# Patient Record
Sex: MALE | Race: OTHER | NOT HISPANIC OR LATINO | URBAN - METROPOLITAN AREA
[De-identification: names, ages, dates, MRNs, and addresses within clinical notes are randomized per-mention and may not be internally consistent; named-entity substitution may affect disease eponyms.]

---

## 2017-05-04 ENCOUNTER — OUTPATIENT (OUTPATIENT)
Dept: OUTPATIENT SERVICES | Facility: HOSPITAL | Age: 22
LOS: 1 days | Discharge: HOME | End: 2017-05-04

## 2017-06-28 DIAGNOSIS — C71.9 MALIGNANT NEOPLASM OF BRAIN, UNSPECIFIED: ICD-10-CM

## 2017-12-02 ENCOUNTER — EMERGENCY (EMERGENCY)
Facility: HOSPITAL | Age: 22
LOS: 0 days | Discharge: HOME | End: 2017-12-02
Admitting: PEDIATRICS

## 2017-12-02 DIAGNOSIS — Z98.890 OTHER SPECIFIED POSTPROCEDURAL STATES: ICD-10-CM

## 2017-12-02 DIAGNOSIS — H81.10 BENIGN PAROXYSMAL VERTIGO, UNSPECIFIED EAR: ICD-10-CM

## 2017-12-02 DIAGNOSIS — R42 DIZZINESS AND GIDDINESS: ICD-10-CM

## 2017-12-24 ENCOUNTER — OUTPATIENT (OUTPATIENT)
Dept: OUTPATIENT SERVICES | Facility: HOSPITAL | Age: 22
LOS: 1 days | Discharge: HOME | End: 2017-12-24

## 2017-12-24 DIAGNOSIS — R51 HEADACHE: ICD-10-CM

## 2019-10-20 ENCOUNTER — INPATIENT (INPATIENT)
Facility: HOSPITAL | Age: 24
LOS: 4 days | Discharge: HOME | End: 2019-10-25
Attending: INTERNAL MEDICINE | Admitting: INTERNAL MEDICINE
Payer: COMMERCIAL

## 2019-10-20 VITALS
RESPIRATION RATE: 20 BRPM | TEMPERATURE: 97 F | DIASTOLIC BLOOD PRESSURE: 70 MMHG | SYSTOLIC BLOOD PRESSURE: 125 MMHG | OXYGEN SATURATION: 98 % | HEART RATE: 71 BPM

## 2019-10-20 LAB
ANION GAP SERPL CALC-SCNC: 10 MMOL/L — SIGNIFICANT CHANGE UP (ref 7–14)
BASOPHILS # BLD AUTO: 0.04 K/UL — SIGNIFICANT CHANGE UP (ref 0–0.2)
BASOPHILS NFR BLD AUTO: 0.6 % — SIGNIFICANT CHANGE UP (ref 0–1)
BUN SERPL-MCNC: 8 MG/DL — LOW (ref 10–20)
CALCIUM SERPL-MCNC: 9.9 MG/DL — SIGNIFICANT CHANGE UP (ref 8.5–10.1)
CHLORIDE SERPL-SCNC: 103 MMOL/L — SIGNIFICANT CHANGE UP (ref 98–110)
CO2 SERPL-SCNC: 28 MMOL/L — SIGNIFICANT CHANGE UP (ref 17–32)
CREAT SERPL-MCNC: 1 MG/DL — SIGNIFICANT CHANGE UP (ref 0.7–1.5)
EOSINOPHIL # BLD AUTO: 0.21 K/UL — SIGNIFICANT CHANGE UP (ref 0–0.7)
EOSINOPHIL NFR BLD AUTO: 3.1 % — SIGNIFICANT CHANGE UP (ref 0–8)
GLUCOSE SERPL-MCNC: 97 MG/DL — SIGNIFICANT CHANGE UP (ref 70–99)
HCT VFR BLD CALC: 49.1 % — SIGNIFICANT CHANGE UP (ref 42–52)
HGB BLD-MCNC: 16.3 G/DL — SIGNIFICANT CHANGE UP (ref 14–18)
IMM GRANULOCYTES NFR BLD AUTO: 0.4 % — HIGH (ref 0.1–0.3)
LYMPHOCYTES # BLD AUTO: 1.55 K/UL — SIGNIFICANT CHANGE UP (ref 1.2–3.4)
LYMPHOCYTES # BLD AUTO: 23.1 % — SIGNIFICANT CHANGE UP (ref 20.5–51.1)
MCHC RBC-ENTMCNC: 28.4 PG — SIGNIFICANT CHANGE UP (ref 27–31)
MCHC RBC-ENTMCNC: 33.2 G/DL — SIGNIFICANT CHANGE UP (ref 32–37)
MCV RBC AUTO: 85.5 FL — SIGNIFICANT CHANGE UP (ref 80–94)
MONOCYTES # BLD AUTO: 0.51 K/UL — SIGNIFICANT CHANGE UP (ref 0.1–0.6)
MONOCYTES NFR BLD AUTO: 7.6 % — SIGNIFICANT CHANGE UP (ref 1.7–9.3)
NEUTROPHILS # BLD AUTO: 4.37 K/UL — SIGNIFICANT CHANGE UP (ref 1.4–6.5)
NEUTROPHILS NFR BLD AUTO: 65.2 % — SIGNIFICANT CHANGE UP (ref 42.2–75.2)
NRBC # BLD: 0 /100 WBCS — SIGNIFICANT CHANGE UP (ref 0–0)
PLATELET # BLD AUTO: 231 K/UL — SIGNIFICANT CHANGE UP (ref 130–400)
POTASSIUM SERPL-MCNC: 4.5 MMOL/L — SIGNIFICANT CHANGE UP (ref 3.5–5)
POTASSIUM SERPL-SCNC: 4.5 MMOL/L — SIGNIFICANT CHANGE UP (ref 3.5–5)
RBC # BLD: 5.74 M/UL — SIGNIFICANT CHANGE UP (ref 4.7–6.1)
RBC # FLD: 12.2 % — SIGNIFICANT CHANGE UP (ref 11.5–14.5)
SODIUM SERPL-SCNC: 141 MMOL/L — SIGNIFICANT CHANGE UP (ref 135–146)
WBC # BLD: 6.71 K/UL — SIGNIFICANT CHANGE UP (ref 4.8–10.8)
WBC # FLD AUTO: 6.71 K/UL — SIGNIFICANT CHANGE UP (ref 4.8–10.8)

## 2019-10-20 PROCEDURE — 70496 CT ANGIOGRAPHY HEAD: CPT | Mod: 26

## 2019-10-20 PROCEDURE — 99285 EMERGENCY DEPT VISIT HI MDM: CPT

## 2019-10-20 PROCEDURE — 70498 CT ANGIOGRAPHY NECK: CPT | Mod: 26

## 2019-10-20 PROCEDURE — 99222 1ST HOSP IP/OBS MODERATE 55: CPT

## 2019-10-20 PROCEDURE — 93010 ELECTROCARDIOGRAM REPORT: CPT

## 2019-10-20 RX ORDER — CHLORHEXIDINE GLUCONATE 213 G/1000ML
1 SOLUTION TOPICAL
Refills: 0 | Status: DISCONTINUED | OUTPATIENT
Start: 2019-10-20 | End: 2019-10-25

## 2019-10-20 RX ORDER — MECLIZINE HCL 12.5 MG
25 TABLET ORAL THREE TIMES A DAY
Refills: 0 | Status: DISCONTINUED | OUTPATIENT
Start: 2019-10-20 | End: 2019-10-22

## 2019-10-20 RX ORDER — MECLIZINE HCL 12.5 MG
1 TABLET ORAL
Qty: 60 | Refills: 0
Start: 2019-10-20 | End: 2019-11-18

## 2019-10-20 RX ORDER — SODIUM CHLORIDE 9 MG/ML
1000 INJECTION, SOLUTION INTRAVENOUS ONCE
Refills: 0 | Status: COMPLETED | OUTPATIENT
Start: 2019-10-20 | End: 2019-10-20

## 2019-10-20 RX ORDER — MECLIZINE HCL 12.5 MG
25 TABLET ORAL ONCE
Refills: 0 | Status: COMPLETED | OUTPATIENT
Start: 2019-10-20 | End: 2019-10-20

## 2019-10-20 RX ADMIN — SODIUM CHLORIDE 1000 MILLILITER(S): 9 INJECTION, SOLUTION INTRAVENOUS at 12:51

## 2019-10-20 RX ADMIN — Medication 25 MILLIGRAM(S): at 11:40

## 2019-10-20 NOTE — CONSULT NOTE ADULT - ATTENDING COMMENTS
I have personally seen and examined this patient.  I have fully participated in the care of this patient.  I have reviewed all pertinent clinical information, influding history, physical exam, plan and note.   I have reviewed all pertinent clinical information and reviewed all relevant imaging and diagnostic studies personally.  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  See progress note for updated recommendations.

## 2019-10-20 NOTE — H&P ADULT - NSHPLABSRESULTS_GEN_ALL_CORE
16.3   6.71  )-----------( 231      ( 20 Oct 2019 12:40 )             49.1     10-20    141  |  103  |  8<L>  ----------------------------<  97  4.5   |  28  |  1.0    Ca    9.9      20 Oct 2019 12:40        < from: 12 Lead ECG (10.20.19 @ 12:19) >    Diagnosis Line Normal sinus rhythm  Normal ECG    < end of copied text >      < from: CT Angio Head w/ IV Cont (10.20.19 @ 16:09) >    IMPRESSION:     No evidence of major vascular stenosis or occlusion.    < end of copied text >    IMPRESSION:     No evidence of major vascular stenosis or occlusion.    < end of copied text >

## 2019-10-20 NOTE — ED PROVIDER NOTE - NS ED ROS FT
Eyes:  No visual changes, eye pain or discharge.  ENMT:  +hearing changes, no pain, no sore throat or runny nose, no difficulty swallowing  Cardiac:  No chest pain, SOB or edema. No chest pain with exertion.  Respiratory:  No cough or respiratory distress.    GI:  No nausea, vomiting, diarrhea or abdominal pain.  :  No dysuria, frequency or burning.  MS:  No myalgia, muscle weakness, joint pain or back pain.  Neuro:  +headache , no weakness.  No LOC.  Skin:  No skin rash.   Endocrine: No history of thyroid disease or diabetes.

## 2019-10-20 NOTE — ED ADULT NURSE NOTE - NSIMPLEMENTINTERV_GEN_ALL_ED
Implemented All Universal Safety Interventions:  [place] to call system. Call bell, personal items and telephone within reach. Instruct patient to call for assistance. Room bathroom lighting operational. Non-slip footwear when patient is off stretcher. Physically safe environment: no spills, clutter or unnecessary equipment. Stretcher in lowest position, wheels locked, appropriate side rails in place.

## 2019-10-20 NOTE — ED ADULT NURSE REASSESSMENT NOTE - NS ED NURSE REASSESS COMMENT FT1
Pt assessed no changes in complaints at this time. pt refusing bed alarm at this time. on continuous cardiac monitoring and pulse ox monitoring.

## 2019-10-20 NOTE — ED ADULT NURSE NOTE - OBJECTIVE STATEMENT
Pt c/o dizziness for about a month. Pt saw neurologist for this symptom and received referral for an MRI. Pt c/o dizziness for about a month associated with headaches at times and left facial and right leg numbness. Pt saw neurologist for this symptom and received referral for an MRI. Denies chest pain, SOB, fevers, chills, blurry vision, or syncope.

## 2019-10-20 NOTE — H&P ADULT - NSHPPHYSICALEXAM_GEN_ALL_CORE
T(C): 36.7 (10-20-19 @ 20:48), Max: 36.7 (10-20-19 @ 20:48)  T(F): 98 (10-20-19 @ 20:48), Max: 98 (10-20-19 @ 20:48)  HR: 81 (10-20-19 @ 20:48) (70 - 81)  BP: 111/68 (10-20-19 @ 20:48) (111/68 - 125/70)  RR: 18 (10-20-19 @ 20:48) (18 - 98)  SpO2: 98% (10-20-19 @ 20:48) (98% - 98%)  Wt(kg): --    PHYSICAL EXAM:    General: Not in distress. Well-developed, speaks in full sentences. AAOx3  HEENT: Atraumatic, normocephalic. Moist mucus membranes. L pupil fixed and dilated  Cardio: Regular rate and rhythm. S1, S2 appreciated. no murmurs.  Pulm: Bilateral breath sounds. No wheezing, or rhonchi  Abdomen: Soft, non-tender, non-distended. Normoactive bowel sounds.  Extremities: No cyanosis or edema bilaterally. No calf tenderness to palpation.  Neuro: . Motor 5/5 throughout. Sensation intact  L facial numbness.. CN II-XI intact

## 2019-10-20 NOTE — H&P ADULT - ASSESSMENT
24y M with PMH of vertigo  presenting with a 1 month history of  dizziness.    #Chronic dizziness, L facial numbness, RLE numbness  - BPPV vs vestibular neuritis vs meniere's vs Multiple sclerosis  - Pt reports constantsymptoms lasting 2 weeks which goes against BPPV.  - Consider Audiometry to r/o meniere's disease as pt also endorses R ear heaviness, and tinnitus.  - MRI Head and c-spine  - B12, folate, TSH  - Admit to Stroke unit. F/u  neurology  - c/w meclizine 25mg q8hr standing for symptomatic control.     Diet: Regular diet  Dvt Ppx: Not indicated  Activity: out of bed to chair     #Dispo: Stroke unit    #Code Status: Full Code

## 2019-10-20 NOTE — ED ADULT TRIAGE NOTE - CHIEF COMPLAINT QUOTE
lightheaded and dizziness since the beginning of the month. I went to my neurologist and I have a script for an MRI

## 2019-10-20 NOTE — CONSULT NOTE ADULT - SUBJECTIVE AND OBJECTIVE BOX
NEUROENDOVASCULAR CONSULT NOTE    Consulted by Dr. Wade for dizziness.    Patient is a 24y old  Male who presents with a chief complaint of dizziness    HPI: This is a 24 year old right handed man with past medical history of vertigo, dizziness and trauma to his left pupil in 2009 after paintballing presents with worsening left facial/tongue numbness, lightheadedness, and dizziness when walking. He reports these similar symptoms since the beginning of this month but was more severe since this morning. Dizziness is worst when lying flat and has sensation of the "room spinning". Patient has seen his neurologist Dr. Sweet in [place] who prescribed an Brain MRI and MRI C-spine for MS work up as per patient and he was going to make an appointment for. He denies history of aneurysm/stroke in past, denies family history of neurological concerns. Denies visual changes with dizziness, tingling. He recently came back from [place], denies tick bites, but admits to recent URI last month.       PAST MEDICAL & SURGICAL HISTORY:  Vertigo    FAMILY HISTORY:  Denies any personal or familial history of aneurysm, chronic infectious/inflammatory disease, connective tissue disease (Chalino-Danlos syndrome or Marfan syndrome), PCKD    SOCIAL HISTORY:  Smoking/ETOH/Drugs: + smokes hookah, occasional drinks, denies drugs    Allergies  No Known Allergies  Intolerances    REVIEW OF SYSTEMS  Constitutional: No fever, weight loss or fatigue  Eyes: No eye pain, visual disturbances, or discharge  ENMT:  No difficulty hearing, tinnitus, vertigo; No sinus or throat pain  Neck: No pain or stiffness  Respiratory: No cough, wheezing, chills or hemoptysis  Cardiovascular: No chest pain, palpitations, shortness of breath, dizziness or leg swelling  Gastrointestinal: No abdominal pain. No nausea, vomiting or hematemesis; No diarrhea or constipation  Genitourinary: No dysuria, frequency, hematuria or incontinence  Neurological: As per HPI, + dizziness  Skin: No itching, burning, rashes or lesions   Endocrine: No heat or cold intolerance; No hair loss  Musculoskeletal: No joint pain or swelling; No muscle, back or extremity pain  Psychiatric: No depression, anxiety, mood swings or difficulty sleeping  Heme/Lymph: No easy bruising or bleeding gums    PHYSICAL EXAM:    Vital Signs Last 24 Hrs  T(C): 36.2 (20 Oct 2019 10:53), Max: 36.2 (20 Oct 2019 10:53)  T(F): 97.2 (20 Oct 2019 10:53), Max: 97.2 (20 Oct 2019 10:53)  HR: 71 (20 Oct 2019 10:53) (71 - 71)  BP: 125/70 (20 Oct 2019 10:53) (125/70 - 125/70)  BP(mean): --  RR: 20 (20 Oct 2019 10:53) (20 - 20)  SpO2: 98% (20 Oct 2019 10:53) (98% - 98%)    Neurologic Exam:  Mental status: Awake, alert and oriented x 4. Recent and remote memory intact. Naming, repetition and comprehension intact.  Attention/concentration intact.  No dysarthria, no aphasia.  Fund of knowledge appropriate.    Cranial nerves: Right pupil equally round and reactive to light 4 to 3mm. Left pupil blown/damaged. Visual fields full. Right eye 20/20, left eye acuity impaired, no nystagmus, extraocular muscles intact, V1 through V3 intact bilaterally and symmetric, face symmetric, hearing intact to finger rub, palate elevation symmetric, tongue was midline, sternocleidomastoid/shoulder shrug strength bilaterally 5/5.    Motor:  Normal bulk and tone, strength 5/5 in bilateral upper and lower extremities.  strength 5/5.  Fingers abduction/adduction strong 5/5. Rapid alternating movements intact and symmetric.   Sensation: Intact to light touch, proprioception, and pinprick.  No neglect.   Coordination: No dysmetria on finger-to-nose and heel-to-shin.  No clumsiness.  Reflexes: 2+ in upper and lower extremities, downgoing toes bilaterally  Gait: Narrow and steady. No ataxia.  Romberg negative    Cardio: +S1S2 No M/R/G  Pulm: CTA B/L no W/R/R  Skin: Warm, Dry, Capillary refill <2 seconds, good skin turgor  Abd: Soft, NTND  Ext: no c/c/e    NIH STROKE SCALE  Item	                                                        Score  1 a.	Level of Consciousness	               	0  1 b. LOC Questions	                                    0  1 c.	LOC Commands	                               	0  2.	Best Gaze	                                    0  3.	Visual	                                                0  4.	Facial Palsy	                                    0  5 a.	Motor Arm - Left	                        0  5 b.	Motor Arm - Right	                        0  6 a.	Motor Leg - Left	                                0  6 b.	Motor Leg - Right	                                0  7.	Limb Ataxia	                                        0  8.	Sensory	                                                0  9.	Language	                                        0  10.	Dysarthria	                                        0  11.	Extinction and Inattention  	        0  ______________________________________  TOTAL	                                                        0    mRS: 0 No symptoms at all    LABS                        16.3   6.71  )-----------( 231      ( 20 Oct 2019 12:40 )             49.1     10-20    141  |  103  |  8<L>  ----------------------------<  97  4.5   |  28  |  1.0    Ca    9.9      20 Oct 2019 12:40    RADIOLOGY/EKG:  < from: CT Angio Head w/ IV Cont (10.20.19 @ 16:09) >  EXAM:  CT ANGIO NECK (W)AW IC        EXAM:  CT ANGIO BRAIN (W)AW IC          PROCEDURE DATE:  10/20/2019        INTERPRETATION:  Clinical History / Reason for exam: Left facial   numbness, right lower extremity numbness, headache, walking to the left.    Technique: CT angiogram of the head and neck. CTA of the head and neck   was performed following the intravenous administration of 100 cc Optiray   320 (0 cc discarded) with coronal, sagittal and multiple 3-D MIP and   volume rendered reformats.    Comparison: None  An MRI brain with and without contrast 12/24/2017 is reviewed.    Findings:     NECK:  The visualized aortic arch and great vessel origins are patent.    The right common, internal and external carotid arteries are patent.    The left common, internal and external carotid arteries are patent.    The vertebral arteries are patent. There is a direct origin of the left   vertebral artery from the aortic arch, normal variation.    HEAD:  The distal internal carotid arteries are patent. The middle cerebral   arteries are patent. There is a dominant bihemispheric right anterior   cerebral artery common normal variation.  The left NOLA is diminutive.    The distal vertebral arteries are patent.  The basilar artery is patent.   The posterior cerebral arteries are patent.    OTHER:  Patient is status post left scleral banding.  Mild cervical spine degenerative changes.    IMPRESSION:   No evidence of major vascular stenosis or occlusion.      QRS axis to [] ° and NSR at a rate of [] BPM. There was no atrial enlargement. There was no ventricular hypertrophy. There were no ST-T changes and all intervals were normal. Neurology CONSULT NOTE    Consulted by Dr. Wade for dizziness.    Patient is a 24y old  Male who presents with a chief complaint of dizziness    HPI: This is a 24 year old right handed man with past medical history of vertigo, dizziness and trauma to his left pupil in 2009 after paintballing presents with worsening left facial/tongue numbness, lightheadedness, and dizziness when walking. He reports these similar symptoms since the beginning of this month but was more severe since this morning. Dizziness is worst when lying flat and has sensation of the "room spinning". Patient has seen his neurologist Dr. Sweet in [place] who prescribed an Brain MRI and MRI C-spine for MS work up as per patient and he was going to make an appointment for. He denies history of aneurysm/stroke in past, denies family history of neurological concerns. Denies visual changes with dizziness, tingling. He recently came back from [place], denies tick bites, but admits to recent URI last month.       PAST MEDICAL & SURGICAL HISTORY:  Vertigo    FAMILY HISTORY:  Denies any personal or familial history of aneurysm, chronic infectious/inflammatory disease, connective tissue disease (Chalino-Danlos syndrome or Marfan syndrome), PCKD    SOCIAL HISTORY:  Smoking/ETOH/Drugs: + smokes hookah, occasional drinks, denies drugs    Allergies  No Known Allergies  Intolerances    REVIEW OF SYSTEMS  Constitutional: No fever, weight loss or fatigue  Eyes: No eye pain, visual disturbances, or discharge  ENMT:  No difficulty hearing, tinnitus, vertigo; No sinus or throat pain  Neck: No pain or stiffness  Respiratory: No cough, wheezing, chills or hemoptysis  Cardiovascular: No chest pain, palpitations, shortness of breath, dizziness or leg swelling  Gastrointestinal: No abdominal pain. No nausea, vomiting or hematemesis; No diarrhea or constipation  Genitourinary: No dysuria, frequency, hematuria or incontinence  Neurological: As per HPI, + dizziness  Skin: No itching, burning, rashes or lesions   Endocrine: No heat or cold intolerance; No hair loss  Musculoskeletal: No joint pain or swelling; No muscle, back or extremity pain  Psychiatric: No depression, anxiety, mood swings or difficulty sleeping  Heme/Lymph: No easy bruising or bleeding gums    PHYSICAL EXAM:    Vital Signs Last 24 Hrs  T(C): 36.2 (20 Oct 2019 10:53), Max: 36.2 (20 Oct 2019 10:53)  T(F): 97.2 (20 Oct 2019 10:53), Max: 97.2 (20 Oct 2019 10:53)  HR: 71 (20 Oct 2019 10:53) (71 - 71)  BP: 125/70 (20 Oct 2019 10:53) (125/70 - 125/70)  BP(mean): --  RR: 20 (20 Oct 2019 10:53) (20 - 20)  SpO2: 98% (20 Oct 2019 10:53) (98% - 98%)    Neurologic Exam:  Mental status: Awake, alert and oriented x 4. Recent and remote memory intact. Naming, repetition and comprehension intact.  Attention/concentration intact.  No dysarthria, no aphasia.  Fund of knowledge appropriate.    Cranial nerves: Right pupil equally round and reactive to light 4 to 3mm. Left pupil blown/damaged. Visual fields full. Right eye 20/20, left eye acuity impaired, no nystagmus, extraocular muscles intact, V1 through V3 intact bilaterally and symmetric, face symmetric, hearing intact to finger rub, palate elevation symmetric, tongue was midline, sternocleidomastoid/shoulder shrug strength bilaterally 5/5.    Motor:  Normal bulk and tone, strength 5/5 in bilateral upper and lower extremities.  strength 5/5.  Fingers abduction/adduction strong 5/5. Rapid alternating movements intact and symmetric.   Sensation: Intact to light touch, proprioception, and pinprick.  No neglect.   Coordination: No dysmetria on finger-to-nose and heel-to-shin.  No clumsiness.  Reflexes: 2+ in upper and lower extremities, downgoing toes bilaterally  Gait: Narrow and steady. subtle ataxia LUE.    Cardio: +S1S2 No M/R/G  Pulm: CTA B/L no W/R/R  Skin: Warm, Dry, Capillary refill <2 seconds, good skin turgor  Abd: Soft, NTND  Ext: no c/c/e    mRS: 0 No symptoms at all  NIHSS: 0    LABS                        16.3   6.71  )-----------( 231      ( 20 Oct 2019 12:40 )             49.1     10-20    141  |  103  |  8<L>  ----------------------------<  97  4.5   |  28  |  1.0    Ca    9.9      20 Oct 2019 12:40    RADIOLOGY/EKG:  < from: CT Angio Head w/ IV Cont (10.20.19 @ 16:09) >  EXAM:  CT ANGIO NECK (W)AW IC        EXAM:  CT ANGIO BRAIN (W)AW IC          PROCEDURE DATE:  10/20/2019        INTERPRETATION:  Clinical History / Reason for exam: Left facial   numbness, right lower extremity numbness, headache, walking to the left.    Technique: CT angiogram of the head and neck. CTA of the head and neck   was performed following the intravenous administration of 100 cc Optiray   320 (0 cc discarded) with coronal, sagittal and multiple 3-D MIP and   volume rendered reformats.    Comparison: None  An MRI brain with and without contrast 12/24/2017 is reviewed.    Findings:     NECK:  The visualized aortic arch and great vessel origins are patent.    The right common, internal and external carotid arteries are patent.    The left common, internal and external carotid arteries are patent.    The vertebral arteries are patent. There is a direct origin of the left   vertebral artery from the aortic arch, normal variation.    HEAD:  The distal internal carotid arteries are patent. The middle cerebral   arteries are patent. There is a dominant bihemispheric right anterior   cerebral artery common normal variation.  The left NOLA is diminutive.    The distal vertebral arteries are patent.  The basilar artery is patent.   The posterior cerebral arteries are patent.    OTHER:  Patient is status post left scleral banding.  Mild cervical spine degenerative changes.    IMPRESSION:   No evidence of major vascular stenosis or occlusion.      QRS axis to [] ° and NSR at a rate of [] BPM. There was no atrial enlargement. There was no ventricular hypertrophy. There were no ST-T changes and all intervals were normal.

## 2019-10-20 NOTE — ED PROVIDER NOTE - CLINICAL SUMMARY MEDICAL DECISION MAKING FREE TEXT BOX
Pt w/ dizziness and balance disturbance. CTA head neck neg. Pt stated felt better, prepped for dc, but when walking again, still had unstable gait and sx returned.   Pt w/ worsening neurologic sx.  Discussed case w/ Dr. Ascencio, Neuro. Pt admitted to stroke unit for further management.

## 2019-10-20 NOTE — ED PROVIDER NOTE - CARE PLAN
Principal Discharge DX:	Vertigo  Secondary Diagnosis:	Leg numbness  Secondary Diagnosis:	Facial numbness

## 2019-10-20 NOTE — H&P ADULT - ATTENDING COMMENTS
24y M with PMH of vertigo  presenting with a 1 month history of  dizziness.    Pt endorses dizziness that started at the beginning of the month. It was intermittent at first, but has become constant lasting the past 2 weeks. His dizziness is associated with intermittent headaches, L facial and RLE numbness, and is not associated with sudden head movements, or situational triggers. It gets worse when he lies flat. Pt also endorses sensation of "room spinning" around him, and intermittent tinnitus in his R ear, as well as R ear fullness/"heaviness". He saw a neurolgoist in [place] who referred him for an MRI Head and c-pine, which was scheduled for later this week.    Of note, L pupil is blown out from an old paintball injury; no new visual changes.    REVIEW OF SYSTEMS:  CONSTITUTIONAL: No weakness, fevers or chills  EYES/ENT: No visual changes;  No vertigo or throat pain   NECK: No pain or stiffness  RESPIRATORY: No cough, wheezing, hemoptysis; No shortness of breath  CARDIOVASCULAR: No chest pain or palpitations  GASTROINTESTINAL: No abdominal or epigastric pain. No nausea, vomiting, or hematemesis; No diarrhea or constipation. No melena or hematochezia.  GENITOURINARY: No dysuria, frequency or hematuria  NEUROLOGICAL: (+) numbness - see cc/HPI   SKIN: No itching, rashes    Physical Exam:  General: WN/WD NAD  Neurology: A&Ox3, nonfocal, follows commands  Eyes: L pupil injury 'blown out'/ EOMI  ENT/Neck: Neck supple, trachea midline, No JVD  Respiratory: CTA B/L, No wheezing, rales, rhonchi  CV: Normal rate regular rhythm, S1S2, no murmurs, rubs or gallops  Abdominal: Soft, NT, ND +BS,   Extremities: No edema, + peripheral pulses  Skin: No Rashes, Hematoma, Ecchymosis  Incisions:   Tubes:    A/P  Chronic dizziness / paresthesias - L facial and RLE numbness r/o demyelinating disease r/o posterior circulation lesions   -Admit to stroke unit  -MRI head / C-spin w/ and w/o gado  -B12, Folate, TSH   -Neurology eval   -trial of Meclizine     DVT prophylaxis

## 2019-10-20 NOTE — ED PROVIDER NOTE - CARE PROVIDER_API CALL
Antonio Johnson)  EEGEpilepsy; Neurology  [address] [place] [address], [address]  [place] [address]  Phone: [phone]  Fax: [phone]  Follow Up Time: 4-6 Days

## 2019-10-20 NOTE — ED PROVIDER NOTE - PHYSICAL EXAMINATION
Vital Signs: I have reviewed the initial vital signs.  Constitutional: NAD, well-nourished, appears stated age, no acute distress.  HEENT: Airway patent, moist MM, no erythema/swelling/deformity of oral structures. EOMI, PERRLA. TMs clear BL  CV: regular rate, regular rhythm, well-perfused extremities, 2+ b/l DP and radial pulses equal.  Lungs: BCTA, no increased WOB.  ABD: NTND, no guarding or rebound, no pulsatile mass, no hernias.   MSK: Neck supple, nontender, nl ROM, no stepoff. Chest nontender. Back nontender in TLS spine or to b/l bony structures or flanks. Ext nontender, nl rom, no deformity.   INTEG: Skin warm, dry, no rash.  NEURO: A&Ox3, moving all extremities, normal speech. L pupil blown . subjective sensory differential on left face versus right. cn 2-12 otherwise intact, normal strength gait and cerebellar.   PSYCH: Calm, cooperative, normal affect and interaction.

## 2019-10-20 NOTE — ED PROVIDER NOTE - ATTENDING CONTRIBUTION TO CARE
25 y/o M no sig pmh her for eval dizziness, drifting to L w/ gait, R leg numbness (resolved) and L facial numbness intermittent now relatively constant x 1wk. + gradual onset, mild- moderate, non radiating. Seen by neurologist, has MRI scheduled for this week, but worse today. + mild HA. Had some ear fullness this week that resolved.    CONSTITUTIONAL: NAD  SKIN: Warm dry  HEAD: NCAT  EYES: NL inspection  ENT: MMM; TM's clear  NECK: Supple; non tender.  CARD: RRR, no murmur  RESP: CTAB  ABD: S/NT no R/G  EXT: no pedal edema  NEURO: + decreased sensation L V2/v3, CN o/w wnl; NL motor and sensory; no past pointing, NL heel shin; unsteady gait w/ listing to L  PSYCH: Cooperative    IMP: posterior circulation d/o?  P: labs, ekg, cta neck/ head, ivf, reglan, reassess.

## 2019-10-20 NOTE — ED PROVIDER NOTE - PATIENT PORTAL LINK FT
You can access the FollowMyHealth Patient Portal offered by [facility] by registering at the following website: [email or web address]. By joining [facility]’s FollowMyHealth portal, you will also be able to view your health information using other applications (apps) compatible with our system.

## 2019-10-20 NOTE — H&P ADULT - HISTORY OF PRESENT ILLNESS
24y M with PMH of vertigo  presenting with a 1 month history of  dizziness.    Pt endorses dizziness that started at the beginning of the month. It was intermittent at first, but has become constant lasting the past 2 weeks. His dizziness is associated with intermittent headaches, L facial and RLE numbness. It gets worse when he lies flat. Pt also endorses sensation of "room spinning" around him, and intermittent tinnitus in his R ear. He saw a neurolgoist in [place] who referred him for an MRI Head and c-pine, which was scheduled for later this week.  Pt also endorses R ear fullness/heaviness  Of note, L pupil is blown out from an old paintball injury; no new visual changes.    ED Course:  VS: /70 HR 71 T 97.2 satting 98% on RA. He received meclizine 25mg x1 w/ mild improvement in his symptoms, and 1L LR. 24y M with PMH of vertigo  presenting with a 1 month history of  dizziness.    Pt endorses dizziness that started at the beginning of the month. It was intermittent at first, but has become constant lasting the past 2 weeks. His dizziness is associated with intermittent headaches, L facial and RLE numbness, and is not associated with sudden head movements, or situational triggers. It gets worse when he lies flat. Pt also endorses sensation of "room spinning" around him, and intermittent tinnitus in his R ear, as well as R ear fullness/"heaviness". He saw a neurolgoist in [place] who referred him for an MRI Head and c-pine, which was scheduled for later this week.    Of note, L pupil is blown out from an old paintball injury; no new visual changes.    ED Course:  VS: /70 HR 71 T 97.2 satting 98% on RA. He received meclizine 25mg x1 w/ mild improvement in his symptoms, and 1L LR.

## 2019-10-21 LAB
ALBUMIN SERPL ELPH-MCNC: 4.2 G/DL — SIGNIFICANT CHANGE UP (ref 3.5–5.2)
ALP SERPL-CCNC: 43 U/L — SIGNIFICANT CHANGE UP (ref 30–115)
ALT FLD-CCNC: 10 U/L — SIGNIFICANT CHANGE UP (ref 0–41)
ANION GAP SERPL CALC-SCNC: 12 MMOL/L — SIGNIFICANT CHANGE UP (ref 7–14)
AST SERPL-CCNC: 14 U/L — SIGNIFICANT CHANGE UP (ref 0–41)
BILIRUB SERPL-MCNC: 0.6 MG/DL — SIGNIFICANT CHANGE UP (ref 0.2–1.2)
BUN SERPL-MCNC: 16 MG/DL — SIGNIFICANT CHANGE UP (ref 10–20)
CALCIUM SERPL-MCNC: 9.5 MG/DL — SIGNIFICANT CHANGE UP (ref 8.5–10.1)
CHLORIDE SERPL-SCNC: 104 MMOL/L — SIGNIFICANT CHANGE UP (ref 98–110)
CO2 SERPL-SCNC: 26 MMOL/L — SIGNIFICANT CHANGE UP (ref 17–32)
CREAT SERPL-MCNC: 0.9 MG/DL — SIGNIFICANT CHANGE UP (ref 0.7–1.5)
FOLATE SERPL-MCNC: 2.9 NG/ML — LOW
GLUCOSE SERPL-MCNC: 108 MG/DL — HIGH (ref 70–99)
HCT VFR BLD CALC: 45.1 % — SIGNIFICANT CHANGE UP (ref 42–52)
HGB BLD-MCNC: 14.8 G/DL — SIGNIFICANT CHANGE UP (ref 14–18)
HIV 1+2 AB+HIV1 P24 AG SERPL QL IA: SIGNIFICANT CHANGE UP
MAGNESIUM SERPL-MCNC: 2.1 MG/DL — SIGNIFICANT CHANGE UP (ref 1.8–2.4)
MCHC RBC-ENTMCNC: 28.4 PG — SIGNIFICANT CHANGE UP (ref 27–31)
MCHC RBC-ENTMCNC: 32.8 G/DL — SIGNIFICANT CHANGE UP (ref 32–37)
MCV RBC AUTO: 86.6 FL — SIGNIFICANT CHANGE UP (ref 80–94)
NRBC # BLD: 0 /100 WBCS — SIGNIFICANT CHANGE UP (ref 0–0)
PLATELET # BLD AUTO: 212 K/UL — SIGNIFICANT CHANGE UP (ref 130–400)
POTASSIUM SERPL-MCNC: 4.1 MMOL/L — SIGNIFICANT CHANGE UP (ref 3.5–5)
POTASSIUM SERPL-SCNC: 4.1 MMOL/L — SIGNIFICANT CHANGE UP (ref 3.5–5)
PROT SERPL-MCNC: 6.3 G/DL — SIGNIFICANT CHANGE UP (ref 6–8)
RBC # BLD: 5.21 M/UL — SIGNIFICANT CHANGE UP (ref 4.7–6.1)
RBC # FLD: 12.4 % — SIGNIFICANT CHANGE UP (ref 11.5–14.5)
SODIUM SERPL-SCNC: 142 MMOL/L — SIGNIFICANT CHANGE UP (ref 135–146)
TSH SERPL-MCNC: 0.4 UIU/ML — SIGNIFICANT CHANGE UP (ref 0.27–4.2)
VIT B12 SERPL-MCNC: 389 PG/ML — SIGNIFICANT CHANGE UP (ref 232–1245)
WBC # BLD: 6.72 K/UL — SIGNIFICANT CHANGE UP (ref 4.8–10.8)
WBC # FLD AUTO: 6.72 K/UL — SIGNIFICANT CHANGE UP (ref 4.8–10.8)

## 2019-10-21 PROCEDURE — 72156 MRI NECK SPINE W/O & W/DYE: CPT | Mod: 26

## 2019-10-21 PROCEDURE — 99254 IP/OBS CNSLTJ NEW/EST MOD 60: CPT

## 2019-10-21 PROCEDURE — 70553 MRI BRAIN STEM W/O & W/DYE: CPT | Mod: 26

## 2019-10-21 PROCEDURE — 99233 SBSQ HOSP IP/OBS HIGH 50: CPT

## 2019-10-21 RX ORDER — ENOXAPARIN SODIUM 100 MG/ML
40 INJECTION SUBCUTANEOUS DAILY
Refills: 0 | Status: DISCONTINUED | OUTPATIENT
Start: 2019-10-21 | End: 2019-10-25

## 2019-10-21 RX ORDER — PANTOPRAZOLE SODIUM 20 MG/1
40 TABLET, DELAYED RELEASE ORAL DAILY
Refills: 0 | Status: DISCONTINUED | OUTPATIENT
Start: 2019-10-21 | End: 2019-10-25

## 2019-10-21 RX ORDER — ASPIRIN/CALCIUM CARB/MAGNESIUM 324 MG
81 TABLET ORAL DAILY
Refills: 0 | Status: DISCONTINUED | OUTPATIENT
Start: 2019-10-21 | End: 2019-10-25

## 2019-10-21 RX ADMIN — Medication 81 MILLIGRAM(S): at 11:02

## 2019-10-21 RX ADMIN — Medication 50 MILLIGRAM(S): at 18:53

## 2019-10-21 RX ADMIN — Medication 25 MILLIGRAM(S): at 21:07

## 2019-10-21 RX ADMIN — PANTOPRAZOLE SODIUM 40 MILLIGRAM(S): 20 TABLET, DELAYED RELEASE ORAL at 16:29

## 2019-10-21 RX ADMIN — ENOXAPARIN SODIUM 40 MILLIGRAM(S): 100 INJECTION SUBCUTANEOUS at 11:02

## 2019-10-21 RX ADMIN — CHLORHEXIDINE GLUCONATE 1 APPLICATION(S): 213 SOLUTION TOPICAL at 05:32

## 2019-10-21 RX ADMIN — Medication 25 MILLIGRAM(S): at 13:00

## 2019-10-21 RX ADMIN — Medication 25 MILLIGRAM(S): at 05:32

## 2019-10-21 NOTE — CONSULT NOTE ADULT - ASSESSMENT
A 24 year old right handed man with past medical history of vertigo, dizziness and trauma to his left pupil in 2009 after paintballing presents with worsening left facial/tongue numbness, and dizziness. Pt's last MRI brain on 12/24/2017 reports of redemonstrated white matter compatible with demyelinating disease, small enhancing lesion within left frontal coronal radiata, and also lesion within left aspect genu and left parietal lobe. He recently saw his neurologist Dr. Sweet in [place] who prescribed an Brain MRI and MRI C-spine for MS work up as per patient and he was going to make an appointment for but hasn't yet due to his symptoms today.     Suggestions:   -Admit to Stroke unit bed  -May consider repeat MRI brain and MRI C-Spine +/-   -May consider blood lab work Vit B12, HIV, ESR, MENDEZ, ACE   -IV fluid hydration    Pending discussion with attending physician   Andree Medrano, LISA  [id number]
IMPRESSION: Rehab of ataxia ? MS    PRECAUTIONS: [    ] Cardiac  [    ] Respiratory  [    ] Seizures [    ] Contact Isolation  [    ] Droplet Isolation  [    ] Other    Weight Bearing Status:     RECOMMENDATION:    Out of Bed to Chair     DVT/Decubiti Prophylaxis    REHAB PLAN:     [ x    ] Bedside P/T 3-5 times a week   [    x ] Bedside O/T  2-3 times a week   [     ] No Rehab Therapy Indicated   [     ]  Speech Therapy   Conditioning/ROM                                 ADL  Bed Mobility                                            Conditioning/ROM  Transfers                                                  Bed Mobility  Sitting /Standing Balance                      Transfers                                        Gait Training                                            Sitting/Standing Balance  Stair Training [ x  ]Applicable                 Home equipment Eval                                                                     Splinting  [   ] Only      GOALS:   ADL   [   x ]   Independent         Transfers  [x    ] Independent            Ambulation  [   x  ] Independent     [     ] With device                            [    ]  CG                                               [    ]  CG                                                    [     ] CG                            [    ] Min A                                          [    ] Min A                                                [     ] Min  A          DISCHARGE PLAN:   [     ]  Good candidate for Intensive Rehabilitation/Hospital based                                             Will tolerate 3hrs Intensive Rehab Daily                                       [      ]  Short Term Rehab in Skilled Nursing Facility                                       [    x  ]  Home with Outpatient or VN services ?OPD SERVICES ?[facility] FOR [facility]                                         [      ]  Possible Candidate for Intensive Hospital based Rehab

## 2019-10-21 NOTE — PROGRESS NOTE ADULT - ASSESSMENT
24 year old right handed man with hx of vertigo 1 year ago presents for 4 weeks history of dizziness associated with left facial/tongue numbness. Pt's last MRI brain on 12/24/2017 reports of redemonstrated white matter compatible with demyelinating disease, small enhancing lesion within left frontal coronal radiata, and also lesion within left aspect genu and left parietal lobe. He recently saw his neurologist Dr. Sweet in [place] who prescribed an Brain MRI and MRI C-spine for MS work up as per patient and he was going to make an appointment for but hasn't yet due to his symptoms today.     r/o demyelinating lesions affecting the left cerebellum VS ischemic stroke less likely    plan:    -F/U  MRI brain and MRI C-Spine +/- result  -please order TSH, VITAMIN B12, folate, ESR, vitamin D, RPR, VDRL   -will follow up 24 year old right handed man with hx of vertigo 1 year ago presents for 4 weeks history of dizziness associated with left facial/tongue numbness. Pt's last MRI brain on 12/24/2017 reports of redemonstrated white matter compatible with demyelinating disease, small enhancing lesion within left frontal coronal radiata, and also lesion within left aspect genu and left parietal lobe. He recently saw his neurologist Dr. Sweet in [place] who prescribed an Brain MRI and MRI C-spine for MS work up as per patient and he was going to make an appointment for but hasn't yet due to his symptoms today.     r/o demyelinating lesions affecting the left cerebellum VS ischemic stroke less likely    plan:    -MRI brain showed multiple demyelinating brain lesions with Coreas fingers highly suggestive of MS   - START solumedrol 500 mg IV every 12 hrs for 5 days  -please order TSH, VITAMIN B12, folate, ESR, vitamin D, RPR, VDRL   -c/s MS specialist as OP  -PT   -will follow up 24 year old right handed man with hx of vertigo 1 year ago presents for 4 weeks history of dizziness associated with left facial/tongue numbness. Pt's last MRI brain on 12/24/2017 reports of redemonstrated white matter compatible with demyelinating disease, small enhancing lesion within left frontal coronal radiata, and also lesion within left aspect genu and left parietal lobe. He recently saw his neurologist Dr. Sweet in [place] who prescribed an Brain MRI and MRI C-spine for MS work up as per patient and he was going to make an appointment for but hasn't yet due to his symptoms today.     r/o demyelinating lesions affecting the left cerebellum VS ischemic stroke less likely    plan:    -MRI brain showed multiple demyelinating brain lesions with Coreas fingers highly suggestive of MS   - START solumedrol 1 g daily for 5 days  -please order TSH, VITAMIN B12, folate, ESR, vitamin D, RPR, VDRL   -c/s MS specialist as OP  -PT   -will follow up 24 year old right handed man with hx of vertigo 1 year ago presents for 4 weeks history of dizziness associated with left facial/tongue numbness. Pt's last MRI brain on 12/24/2017 reports of redemonstrated white matter compatible with demyelinating disease, small enhancing lesion within left frontal coronal radiata, and also lesion within left aspect genu and left parietal lobe. He recently saw his neurologist Dr. Sweet in [place] who prescribed an Brain MRI and MRI C-spine for MS work up as per patient and he was going to make an appointment for but hasn't yet due to his symptoms today.  Neuroimaging and clinical presentation c/w relapsing-remitting MS with current active plaque in the L cerebellar peduncle.     plan:  - START solumedrol 5900 mg BID for 5 days  -please order TSH, VITAMIN B12, folate, ESR, vitamin D, RPR, VDRL, NMO antibody, MOG antibody  -c/s MS specialist as OP (Dr. Carola Traylor [phone])  -PT eval and treat

## 2019-10-21 NOTE — OCCUPATIONAL THERAPY INITIAL EVALUATION ADULT - STANDING BALANCE: STATIC
good minus/pt states he feels like he is "drifting to the left" good minus/pt states he feels like he is "drifting to the left." Pt able to self correct slight LOB at this time

## 2019-10-21 NOTE — PROGRESS NOTE ADULT - SUBJECTIVE AND OBJECTIVE BOX
ARVIND CADENA 24y Male  MRN#: [id number]   Hospital Day: 1d    SUBJECTIVE  Patient is a 24y old Male who presents with a chief complaint of Currently admitted to medicine with the primary diagnosis of Vertigo    INTERVAL HPI AND OVERNIGHT EVENTS:  Patient was examined and seen at bedside. This morning he is resting comfortably in bed and reports no issues or overnight events.    No events overnight on telemetry. Patient states that the meclizine has been controlling his symptoms well.    Per nursing, patient has been stumbling to the left when he ambulates to the bathroom.    REVIEW OF SYMPTOMS:  CONSTITUTIONAL: No weakness, fevers or chills; No headaches  EYES: No visual changes, eye pain, or discharge  ENT: No vertigo; No ear pain or change in hearing; No sore throat or difficulty swallowing  NECK: No pain or stiffness  RESPIRATORY: No cough, wheezing, or hemoptysis; No shortness of breath  CARDIOVASCULAR: No chest pain or palpitations  GASTROINTESTINAL: No abdominal or epigastric pain; No nausea, vomiting, or hematemesis; No diarrhea or constipation; No melena or hematochezia  GENITOURINARY: No dysuria, frequency or hematuria  MUSCULOSKELETAL: No joint pain, no muscle pain, no weakness  NEUROLOGICAL: No numbness or weakness  SKIN: No itching or rashes      OBJECTIVE  PAST MEDICAL & SURGICAL HISTORY  Vertigo    ALLERGIES:  No Known Allergies    MEDICATIONS:  STANDING MEDICATIONS  aspirin enteric coated 81 milliGRAM(s) Oral daily  chlorhexidine 4% Liquid 1 Application(s) Topical <User Schedule>  enoxaparin Injectable 40 milliGRAM(s) SubCutaneous daily  meclizine 25 milliGRAM(s) Oral three times a day    PRN MEDICATIONS      VITAL SIGNS: Last 24 Hours  T(C): 36 (21 Oct 2019 06:27), Max: 36.7 (20 Oct 2019 20:48)  T(F): 96.8 (21 Oct 2019 06:27), Max: 98 (20 Oct 2019 20:48)  HR: 66 (21 Oct 2019 06:27) (60 - 81)  BP: 109/52 (21 Oct 2019 06:27) (100/55 - 125/70)  BP(mean): --  RR: 18 (21 Oct 2019 06:27) (18 - 98)  SpO2: 98% (20 Oct 2019 23:35) (98% - 98%)    LABS:                        14.8   6.72  )-----------( 212      ( 21 Oct 2019 05:22 )             45.1     10-21    142  |  104  |  16  ----------------------------<  108<H>  4.1   |  26  |  0.9    Ca    9.5      21 Oct 2019 05:22  Mg     2.1     10-21    TPro  6.3  /  Alb  4.2  /  TBili  0.6  /  DBili  x   /  AST  14  /  ALT  10  /  AlkPhos  43  10-21                  RADIOLOGY:    PHYSICAL EXAM:  CONSTITUTIONAL: No acute distress, well-developed, well-groomed, AAOx3  HEAD: Atraumatic, normocephalic  EYES: EOM intact, PERRLA, conjunctiva and sclera clear  ENT: Supple, no masses, no thyromegaly, no bruits, no JVD; moist mucous membranes  PULMONARY: Clear to auscultation bilaterally; no wheezes, rales, or rhonchi  CARDIOVASCULAR: Regular rate and rhythm; no murmurs, rubs, or gallops  GASTROINTESTINAL: Soft, non-tender, non-distended; bowel sounds present  MUSCULOSKELETAL: 2+ peripheral pulses; no clubbing, no cyanosis, no edema  NEUROLOGY: non-focal  SKIN: No rashes or lesions; warm and dry    ASSESSMENT & PLAN  #    PAST MEDICAL & SURGICAL HISTORY:  Vertigo      #Misc  - DVT Prophylaxis:  - Diet:  - GI Prophylaxis:  - Activity:  - IV Fluids:  - Code Status:    Dispo: ARVIND CADENA 24y Male  MRN#: [id number]   Hospital Day: 1d    SUBJECTIVE  Patient is a 24y old Male who presents with a chief complaint of Currently admitted to medicine with the primary diagnosis of Vertigo    INTERVAL HPI AND OVERNIGHT EVENTS:  Patient was examined and seen at bedside. This morning he is resting comfortably in bed and reports no issues or overnight events. No events overnight on telemetry. Patient states that the meclizine has been controlling his symptoms well. He does endorse that the numbness is still present in his left face and right leg.    Per nursing, patient has been stumbling to the left when he ambulates to the bathroom.    REVIEW OF SYMPTOMS:  CONSTITUTIONAL: No weakness, fevers or chills; No headaches  EYES: No visual changes, eye pain, or discharge  ENT: No vertigo; No ear pain or change in hearing; No sore throat or difficulty swallowing  NECK: No pain or stiffness  RESPIRATORY: No cough, wheezing, or hemoptysis; No shortness of breath  CARDIOVASCULAR: No chest pain or palpitations  GASTROINTESTINAL: No abdominal or epigastric pain; No nausea, vomiting, or hematemesis; No diarrhea or constipation; No melena or hematochezia  GENITOURINARY: No dysuria, frequency or hematuria  MUSCULOSKELETAL: No joint pain, no muscle pain, no weakness  NEUROLOGICAL: (+) left facial numbness and right lower extremity numbness	  SKIN: No itching or rashes      OBJECTIVE  PAST MEDICAL & SURGICAL HISTORY  Vertigo    ALLERGIES:  No Known Allergies    MEDICATIONS:  STANDING MEDICATIONS  aspirin enteric coated 81 milliGRAM(s) Oral daily  chlorhexidine 4% Liquid 1 Application(s) Topical <User Schedule>  enoxaparin Injectable 40 milliGRAM(s) SubCutaneous daily  meclizine 25 milliGRAM(s) Oral three times a day    PRN MEDICATIONS      VITAL SIGNS: Last 24 Hours  T(C): 36 (21 Oct 2019 06:27), Max: 36.7 (20 Oct 2019 20:48)  T(F): 96.8 (21 Oct 2019 06:27), Max: 98 (20 Oct 2019 20:48)  HR: 66 (21 Oct 2019 06:27) (60 - 81)  BP: 109/52 (21 Oct 2019 06:27) (100/55 - 125/70)  BP(mean): --  RR: 18 (21 Oct 2019 06:27) (18 - 98)  SpO2: 98% (20 Oct 2019 23:35) (98% - 98%)    LABS:                        14.8   6.72  )-----------( 212      ( 21 Oct 2019 05:22 )             45.1     10-21    142  |  104  |  16  ----------------------------<  108<H>  4.1   |  26  |  0.9    Ca    9.5      21 Oct 2019 05:22  Mg     2.1     10-21    TPro  6.3  /  Alb  4.2  /  TBili  0.6  /  DBili  x   /  AST  14  /  ALT  10  /  AlkPhos  43  10-21                  RADIOLOGY:  < from: CT Angio Head w/ IV Cont (10.20.19 @ 16:09) >  INTERPRETATION:  Clinical History / Reason for exam: Left facial   numbness, right lower extremity numbness, headache, walking to the left.    Technique: CT angiogram of the head and neck. CTA of the head and neck   was performed following the intravenous administration of 100 cc Optiray   320 (0 cc discarded) with coronal, sagittal and multiple 3-D MIP and   volume rendered reformats.    Comparison: None  An MRI brain with and without contrast 12/24/2017 is reviewed.    Findings:     NECK:  The visualized aortic arch and great vessel origins are patent.    The right common, internal and external carotid arteries are patent.    The left common, internal and external carotid arteries are patent.    The vertebral arteries are patent. There is a direct origin of the left   vertebral artery from the aortic arch, normal variation.    HEAD:  The distal internal carotid arteries are patent. The middle cerebral   arteries are patent. There is a dominant bihemispheric right anterior   cerebral artery common normal variation.  The left NOLA is diminutive.    The distal vertebral arteries are patent.  The basilar artery is patent.   The posterior cerebral arteries are patent.    OTHER:  Patient is status post left scleral banding.  Mild cervical spine degenerative changes.    IMPRESSION:     No evidence of major vascular stenosis or occlusion.    < end of copied text >      PHYSICAL EXAM:  CONSTITUTIONAL: No acute distress, well-developed, well-groomed, AAOx3  HEAD: Atraumatic, normocephalic  EYES: EOM intact, right pupil intact with direct and consensual pupillary light reflex, (+) left pupil is blown, conjunctiva and sclera clear  ENT: Supple, no masses, no thyromegaly, no bruits, no JVD; moist mucous membranes  PULMONARY: Clear to auscultation bilaterally; no wheezes, rales, or rhonchi  CARDIOVASCULAR: Regular rate and rhythm; no murmurs, rubs, or gallops  GASTROINTESTINAL: Soft, non-tender, non-distended; bowel sounds present  MUSCULOSKELETAL: 2+ peripheral pulses; no clubbing, no cyanosis, no edema  NEUROLOGY: non-focal; (+) unsteady gait when ambulating  SKIN: No rashes or lesions; warm and dry    ASSESSMENT & PLAN  Patient is a 23yo male with PMHx of vertigo, dizziness, and trauma to left pupil in 2009 secondary to paintball incident presenting with a 1-month history of dizziness. Patient's MRI brain on 12/24/2017 re-demonstrated white matter compatible with demyelinating disease, small enhancing lesion within left frontal coronal radiata, and also lesion within left aspect genu and left parietal lobe. He recently saw his neurologist Dr. Sweet in [place] who prescribed an MRI brain and C-spine for MS work up as per patient.    #Chronic dizziness with left facial numbness and right lower extremity numbness  - Possibly secondary to multiple sclerosis vs unlikely BPPV vs vestibular neuritis vs Meniere's disease  - Patient was being evaluated for possible multiple sclerosis outpatient  - Neurology consult appreciated  - Follow MRI head and C-spine with and without contrast  - Follow B12, folate, TSH  - Start aspirin 81mg PO QD  - Continue with meclizine 25mg PO Q8H PRN dizziness    #Misc  - DVT Prophylaxis: Lovenox 40mg SQ QD  - Diet: Regular  - GI Prophylaxis: Not indicated  - Activity: Out of bed to chair  - IV Fluids: Encourage PO intake  - Code Status: Full Code    Dispo: From home

## 2019-10-21 NOTE — OCCUPATIONAL THERAPY INITIAL EVALUATION ADULT - FINE MOTOR COORDINATION, LEFT HAND, FINGER TO NOSE, OT EVAL
normal performance normal performance/Some mild discrepancy noted left side however unclear of the influence of L blurry vision impacting performance normal performance/Some mild discrepancy noted left side however performance may have been impacted by vision

## 2019-10-21 NOTE — CONSULT NOTE ADULT - SUBJECTIVE AND OBJECTIVE BOX
Patient is a 24y old  Male who presents with a chief complaint of dizziness (21 Oct 2019 09:43)    HPI:  24y M with PMH of vertigo  presenting with a 1 month history of  dizziness.    Pt endorses dizziness that started at the beginning of the month. It was intermittent at first, but has become constant lasting the past 2 weeks. His dizziness is associated with intermittent headaches, L facial and RLE numbness, and is not associated with sudden head movements, or situational triggers. It gets worse when he lies flat. Pt also endorses sensation of "room spinning" around him, and intermittent tinnitus in his R ear, as well as R ear fullness/"heaviness". He saw a neurolgoist in [place] who referred him for an MRI Head and c-pine, which was scheduled for later this week.    Of note, L pupil is blown out from an old paintball injury; no new visual changes.    ED Course:  VS: /70 HR 71 T 97.2 satting 98% on RA. He received meclizine 25mg x1 w/ mild improvement in his symptoms, and 1L LR. (20 Oct 2019 23:06)      PAST MEDICAL & SURGICAL HISTORY:  Vertigo      Hospital Course: r/o demyelinating lesions affecting the left cerebellum VS ischemic stroke less likely    plan:    -F/U  MRI brain and MRI C-Spine +/- result  -please order TSH, VITAMIN B12, folate, ESR, vitamin D, RPR, VDRL     TODAY'S SUBJECTIVE & REVIEW OF SYMPTOMS:     Constitutional WNL   Cardio WNL   Resp WNL   GI WNL  Heme WNL  Endo WNL  Skin WNL  MSK WNL  Neuro WNL  Cognitive WNL  Psych WNL      MEDICATIONS  (STANDING):  aspirin enteric coated 81 milliGRAM(s) Oral daily  chlorhexidine 4% Liquid 1 Application(s) Topical <User Schedule>  enoxaparin Injectable 40 milliGRAM(s) SubCutaneous daily  meclizine 25 milliGRAM(s) Oral three times a day    MEDICATIONS  (PRN):      FAMILY HISTORY:  FH: hypertension      Allergies    No Known Allergies    Intolerances        SOCIAL HISTORY:    [    ] Etoh  [    ] Smoking  [    ] Substance abuse     Home Environment:  [    ] Home Alone  [  x  ] Lives with Family  [    ] Home Health Aid    Dwelling:  [    ] Apartment  [   x ] Private House  [    ] Adult Home  [    ] Skilled Nursing Facility      [    ] Short Term  [    ] Long Term  [   x ] Stairs                           [    ] Elevator     FUNCTIONAL STATUS PTA: (Check all that apply)  Ambulation: [   x  ]Independent    [    ] Dependent     [    ] Non-Ambulatory  Assistive Device: [    ] SA Cane  [    ]  Q Cane  [    ] Walker  [    ]  Wheelchair  ADL : [ x   ] Independent  [    ]  Dependent       Vital Signs Last 24 Hrs  T(C): 36.1 (21 Oct 2019 10:58), Max: 36.7 (20 Oct 2019 20:48)  T(F): 97 (21 Oct 2019 10:58), Max: 98 (20 Oct 2019 20:48)  HR: 83 (21 Oct 2019 10:58) (60 - 83)  BP: 120/64 (21 Oct 2019 10:58) (100/55 - 124/81)  BP(mean): --  RR: 18 (21 Oct 2019 10:58) (18 - 98)  SpO2: 97% (21 Oct 2019 10:58) (97% - 98%)      PHYSICAL EXAM: Alert & Oriented X3  GENERAL: NAD, well-groomed, well-developed  HEAD:  Atraumatic, Normocephalic  EYES: EOMI, PERRLA, conjunctiva and sclera clear  NECK: Supple  CHEST/LUNG: Clear bilaterally  HEART: Regular rate and rhythm  ABDOMEN: Soft, Nontender, Nondistended; Bowel sounds present  EXTREMITIES:  no calf tenderness,no edema BLES    NERVOUS SYSTEM:  Cranial Nerves 2-12 intact [    x] Abnormal  [    ]  ROM: WFL all extremities [   x ]  Abnormal [     ]  Motor Strength: WFL all extremities  [ x   ]  Abnormal [    ]  Sensation: intact to light touch [  x  ] Abnormal [    ]      FUNCTIONAL STATUS:  Bed Mobility: [ x  ]  Independent [    ]  Supervision [    ]  Needs Assistance [  ]  N/A  Transfers: [ x   ]  Independent [    ]  Supervision [    ]  Needs Assistance [    ]  N/A    Ambulation:  [    ]  Independent [    ]  Supervision [  x  ]  Needs Assistance [    ]  N/A   ADL:  [    ]   Independent [    ] Requires Assistance [    ] N/A   ATAXIC LOSES BALANCE TO LEFT    LABS:                        14.8   6.72  )-----------( 212      ( 21 Oct 2019 05:22 )             45.1     10-21    142  |  104  |  16  ----------------------------<  108<H>  4.1   |  26  |  0.9    Ca    9.5      21 Oct 2019 05:22  Mg     2.1     10-21    TPro  6.3  /  Alb  4.2  /  TBili  0.6  /  DBili  x   /  AST  14  /  ALT  10  /  AlkPhos  43  10-21          RADIOLOGY & ADDITIONAL STUDIES:

## 2019-10-21 NOTE — OCCUPATIONAL THERAPY INITIAL EVALUATION ADULT - VISUAL ASSESSMENT: TRACKING
Pt with h/o left eye injury with baseline blurriness. + jerky movement noted in right eye when tracking target laterally over midline.  Pt denies any new visual changes Pt with h/o left eye injury with baseline blurriness. + jerky movement noted in left eye when tracking target laterally over midline.  Pt denies any new visual changes

## 2019-10-21 NOTE — OCCUPATIONAL THERAPY INITIAL EVALUATION ADULT - PERTINENT HX OF CURRENT PROBLEM, REHAB EVAL
Pt admitted 10/20 with c/o dizziness L facial and tongue numbness. Pt states he has been feeling dizzy on and off for the first month however, the feeling intensified and was accompanied by facial numbess yesterday morning warranting him to seek medical evaluation.

## 2019-10-21 NOTE — PROGRESS NOTE ADULT - SUBJECTIVE AND OBJECTIVE BOX
Neurology Progress Note    Interval History:  patient admitted for unsteadiness of 1 month duration associated with left facial numbness. he is still c/o dizziness w/o major improvement on meclizine    HPI:  24y M with PMH of vertigo  presenting with a 1 month history of  dizziness.    Pt endorses dizziness that started at the beginning of the month. It was intermittent at first, but has become constant lasting the past 2 weeks. dizziness is associated with Lfacial numbness, not associated with sudden head movements, or situational triggers. It gets worse when he lies flat.  patient also mentions right ear fullness on presentation but he denies any hearing loss or tinnitus  he denies any nausea or vomiting, no headache, no recent URTI, no motor weakness, no sensory deficits, no head trauma, no dysphagia or dysarthria  Of note, L pupil is blown out from an old paintball injury; no new visual changes.    ED Course:  VS: /70 HR 71 T 97.2 satting 98% on RA. He received meclizine 25mg x1 w/ mild improvement in his symptoms, and 1L LR. (20 Oct 2019 23:06)      PAST MEDICAL & SURGICAL HISTORY:  Vertigo          Medications:  aspirin enteric coated 81 milliGRAM(s) Oral daily  chlorhexidine 4% Liquid 1 Application(s) Topical <User Schedule>  enoxaparin Injectable 40 milliGRAM(s) SubCutaneous daily  meclizine 25 milliGRAM(s) Oral three times a day      Vital Signs Last 24 Hrs  T(C): 36.1 (21 Oct 2019 10:58), Max: 36.7 (20 Oct 2019 20:48)  T(F): 97 (21 Oct 2019 10:58), Max: 98 (20 Oct 2019 20:48)  HR: 83 (21 Oct 2019 10:58) (60 - 83)  BP: 120/64 (21 Oct 2019 10:58) (100/55 - 124/81)  BP(mean): --  RR: 18 (21 Oct 2019 10:58) (18 - 98)  SpO2: 97% (21 Oct 2019 10:58) (97% - 98%)    Neurological Exam:   Mental status: Awake, alert and oriented x3.  Recent and remote memory intact.  Naming, repetition and comprehension intact.  Attention/concentration intact.  No dysarthria, no aphasia.   Cranial nerves: Pupils equally round and reactive to light, visual fields full, no nystagmus, extraocular muscles intact, V1 through V3 intact bilaterally and symmetric, face symmetric, hearing intact to finger rub, palate elevation symmetric, tongue was midline.  Motor:  MRC grading 5/5 b/l UE/LE.   strength 5/5.  Normal tone and bulk.  No abnormal movements.    Sensation: Intact to light touch, proprioception, and pinprick.   Coordination: left dysmetria on finger to nose . no dysdiadokinesia  Reflexes: 2+ in bilateral UE/LE, downgoing toes bilaterally. (-) Romero.  Gait: unsteady , + ataxia feels like he is falling on his left side, negative romberg, not able to do tandem gait    Labs:  CBC Full  -  ( 21 Oct 2019 05:22 )  WBC Count : 6.72 K/uL  RBC Count : 5.21 M/uL  Hemoglobin : 14.8 g/dL  Hematocrit : 45.1 %  Platelet Count - Automated : 212 K/uL  Mean Cell Volume : 86.6 fL  Mean Cell Hemoglobin : 28.4 pg  Mean Cell Hemoglobin Concentration : 32.8 g/dL  Auto Neutrophil # : x  Auto Lymphocyte # : x  Auto Monocyte # : x  Auto Eosinophil # : x  Auto Basophil # : x  Auto Neutrophil % : x  Auto Lymphocyte % : x  Auto Monocyte % : x  Auto Eosinophil % : x  Auto Basophil % : x    10-21    142  |  104  |  16  ----------------------------<  108<H>  4.1   |  26  |  0.9    Ca    9.5      21 Oct 2019 05:22  Mg     2.1     10-21    TPro  6.3  /  Alb  4.2  /  TBili  0.6  /  DBili  x   /  AST  14  /  ALT  10  /  AlkPhos  43  10-21    LIVER FUNCTIONS - ( 21 Oct 2019 05:22 )  Alb: 4.2 g/dL / Pro: 6.3 g/dL / ALK PHOS: 43 U/L / ALT: 10 U/L / AST: 14 U/L / GGT: x Neurology Progress Note    Interval History:  patient admitted for unsteadiness of 1 month duration associated with left facial numbness. he is still c/o dizziness w/o major improvement on meclizine    HPI:  24y M with PMH of vertigo  presenting with a 1 month history of  dizziness.    Pt endorses dizziness that started at the beginning of the month. It was intermittent at first, but has become constant lasting the past 2 weeks. dizziness is associated with Lfacial numbness, not associated with sudden head movements, or situational triggers. It gets worse when he lies flat.  patient also mentions right ear fullness on presentation but he denies any hearing loss or tinnitus  he denies any nausea or vomiting, no headache, no recent URTI, no motor weakness, no sensory deficits, no head trauma, no dysphagia or dysarthria  Of note, L pupil is blown out from an old paintball injury; no new visual changes.    ED Course:  VS: /70 HR 71 T 97.2 satting 98% on RA. He received meclizine 25mg x1 w/ mild improvement in his symptoms, and 1L LR. (20 Oct 2019 23:06)      PAST MEDICAL & SURGICAL HISTORY:  Vertigo 1 year ago           Medications:  aspirin enteric coated 81 milliGRAM(s) Oral daily  chlorhexidine 4% Liquid 1 Application(s) Topical <User Schedule>  enoxaparin Injectable 40 milliGRAM(s) SubCutaneous daily  meclizine 25 milliGRAM(s) Oral three times a day      Vital Signs Last 24 Hrs  T(C): 36.1 (21 Oct 2019 10:58), Max: 36.7 (20 Oct 2019 20:48)  T(F): 97 (21 Oct 2019 10:58), Max: 98 (20 Oct 2019 20:48)  HR: 83 (21 Oct 2019 10:58) (60 - 83)  BP: 120/64 (21 Oct 2019 10:58) (100/55 - 124/81)  BP(mean): --  RR: 18 (21 Oct 2019 10:58) (18 - 98)  SpO2: 97% (21 Oct 2019 10:58) (97% - 98%)    Neurological Exam:   Mental status: Awake, alert and oriented x3.  Recent and remote memory intact.  Naming, repetition and comprehension intact.  Attention/concentration intact.  No dysarthria, no aphasia.   Cranial nerves: Pupils equally round and reactive to light, visual fields full, no nystagmus, extraocular muscles intact, V1 through V3 intact bilaterally and symmetric, face symmetric, hearing intact to finger rub, palate elevation symmetric, tongue was midline.  Motor:  MRC grading 5/5 b/l UE/LE.   strength 5/5.  Normal tone and bulk.  No abnormal movements.    Sensation: Intact to light touch, proprioception, and pinprick.   Coordination: left dysmetria on finger to nose . no dysdiadokinesia  Reflexes: 2+ in bilateral UE/LE, downgoing toes bilaterally. (-) Romero.  Gait: unsteady , + ataxia feels like he is falling on his left side, negative romberg, not able to do tandem gait    Labs:  CBC Full  -  ( 21 Oct 2019 05:22 )  WBC Count : 6.72 K/uL  RBC Count : 5.21 M/uL  Hemoglobin : 14.8 g/dL  Hematocrit : 45.1 %  Platelet Count - Automated : 212 K/uL  Mean Cell Volume : 86.6 fL  Mean Cell Hemoglobin : 28.4 pg  Mean Cell Hemoglobin Concentration : 32.8 g/dL  Auto Neutrophil # : x  Auto Lymphocyte # : x  Auto Monocyte # : x  Auto Eosinophil # : x  Auto Basophil # : x  Auto Neutrophil % : x  Auto Lymphocyte % : x  Auto Monocyte % : x  Auto Eosinophil % : x  Auto Basophil % : x    10-21    142  |  104  |  16  ----------------------------<  108<H>  4.1   |  26  |  0.9    Ca    9.5      21 Oct 2019 05:22  Mg     2.1     10-21    TPro  6.3  /  Alb  4.2  /  TBili  0.6  /  DBili  x   /  AST  14  /  ALT  10  /  AlkPhos  43  10-21    LIVER FUNCTIONS - ( 21 Oct 2019 05:22 )  Alb: 4.2 g/dL / Pro: 6.3 g/dL / ALK PHOS: 43 U/L / ALT: 10 U/L / AST: 14 U/L / GGT: x Neurology Progress Note    Interval History:  patient admitted for unsteadiness of 1 month duration associated with left facial numbness. he is still c/o dizziness w/o major improvement on meclizine    HPI:  24y M with PMH of vertigo  presenting with a 1 month history of  dizziness.    Pt endorses dizziness that started at the beginning of the month. It was intermittent at first, but has become constant lasting the past 2 weeks. dizziness is associated with Lfacial numbness, not associated with sudden head movements, or situational triggers. It gets worse when he lies flat.  patient also mentions right ear fullness on presentation but he denies any hearing loss or tinnitus  he denies any nausea or vomiting, no headache, no recent URTI, no motor weakness, no sensory deficits, no head trauma, no dysphagia or dysarthria  Of note, L pupil is blown out from an old paintball injury; no new visual changes.    ED Course:  VS: /70 HR 71 T 97.2 satting 98% on RA. He received meclizine 25mg x1 w/ mild improvement in his symptoms, and 1L LR. (20 Oct 2019 23:06)    PAST MEDICAL & SURGICAL HISTORY:  Vertigo 1 year ago     Medications:  aspirin enteric coated 81 milliGRAM(s) Oral daily  chlorhexidine 4% Liquid 1 Application(s) Topical <User Schedule>  enoxaparin Injectable 40 milliGRAM(s) SubCutaneous daily  meclizine 25 milliGRAM(s) Oral three times a day    Vital Signs Last 24 Hrs  T(C): 36.1 (21 Oct 2019 10:58), Max: 36.7 (20 Oct 2019 20:48)  T(F): 97 (21 Oct 2019 10:58), Max: 98 (20 Oct 2019 20:48)  HR: 83 (21 Oct 2019 10:58) (60 - 83)  BP: 120/64 (21 Oct 2019 10:58) (100/55 - 124/81)  BP(mean): --  RR: 18 (21 Oct 2019 10:58) (18 - 98)  SpO2: 97% (21 Oct 2019 10:58) (97% - 98%)    Neurological Exam:   Mental status: Awake, alert and oriented x3.  Recent and remote memory intact.  Naming, repetition and comprehension intact.  Attention/concentration intact.  No dysarthria, no aphasia.   Cranial nerves: Pupils equally round and reactive to light, visual fields full, no nystagmus, extraocular muscles intact, V1 through V3 intact bilaterally and symmetric, face symmetric, hearing intact to finger rub, palate elevation symmetric, tongue was midline.  Motor:  MRC grading 5/5 b/l UE/LE.   strength 5/5.  Normal tone and bulk.  No abnormal movements.    Sensation: Intact to light touch, proprioception, and pinprick.   Coordination: left dysmetria on finger to nose . no dysdiadokinesia  Reflexes: 2+ in bilateral UE/LE, downgoing toes bilaterally. (-) Romero.  Gait: unsteady , + ataxia feels like he is falling on his left side, negative romberg, not able to do tandem gait    Labs:  CBC Full  -  ( 21 Oct 2019 05:22 )  WBC Count : 6.72 K/uL  RBC Count : 5.21 M/uL  Hemoglobin : 14.8 g/dL  Hematocrit : 45.1 %  Platelet Count - Automated : 212 K/uL  Mean Cell Volume : 86.6 fL  Mean Cell Hemoglobin : 28.4 pg  Mean Cell Hemoglobin Concentration : 32.8 g/dL  Auto Neutrophil # : x  Auto Lymphocyte # : x  Auto Monocyte # : x  Auto Eosinophil # : x  Auto Basophil # : x  Auto Neutrophil % : x  Auto Lymphocyte % : x  Auto Monocyte % : x  Auto Eosinophil % : x  Auto Basophil % : x    10-21    142  |  104  |  16  ----------------------------<  108<H>  4.1   |  26  |  0.9    Ca    9.5      21 Oct 2019 05:22  Mg     2.1     10-21    TPro  6.3  /  Alb  4.2  /  TBili  0.6  /  DBili  x   /  AST  14  /  ALT  10  /  AlkPhos  43  10-21    LIVER FUNCTIONS - ( 21 Oct 2019 05:22 )  Alb: 4.2 g/dL / Pro: 6.3 g/dL / ALK PHOS: 43 U/L / ALT: 10 U/L / AST: 14 U/L / GGT: x           < from: MR Head w/wo IV Cont (10.21.19 @ 10:27) >  IMPRESSION:     1.  Redemonstrated multiple foci of white matter signal abnormality most   compatible with demyelinating disease. Since the prior exam of 12/24/2017:    2.  New enhancing lesion within the left middle cerebellar   peduncle/brainstem consistent with focus of active demyelination.    3.  Multiple new nonenhancing supratentorial lesions as described.    4.  Increased prominence of a lesion within the body of the corpus   callosum.    CHRISTOPHER DOUGLAS M.D., ATTENDING RADIOLOGIST  This document has been electronically signed. Oct 21 2019  2:41PM    < end of copied text >    < from: MR Cervical Spine w/wo IV Cont (10.21.19 @ 10:45) >  IMPRESSION:    1.  No evidence of abnormal signal or enhancement within the cervical   spinal cord.    2.  Milddegenerative changes without significant canal or foraminal   impingement.    CHRISTOPHER DOUGLAS M.D., ATTENDING RADIOLOGIST  This document has been electronically signed. Oct 21 2019  2:39PM    < end of copied text >

## 2019-10-22 LAB
ERYTHROCYTE [SEDIMENTATION RATE] IN BLOOD: 1 MM/HR — SIGNIFICANT CHANGE UP (ref 0–10)
T PALLIDUM AB TITR SER: NEGATIVE — SIGNIFICANT CHANGE UP

## 2019-10-22 PROCEDURE — 99232 SBSQ HOSP IP/OBS MODERATE 35: CPT

## 2019-10-22 RX ORDER — FOLIC ACID 0.8 MG
1 TABLET ORAL DAILY
Refills: 0 | Status: DISCONTINUED | OUTPATIENT
Start: 2019-10-22 | End: 2019-10-25

## 2019-10-22 RX ORDER — CHOLECALCIFEROL (VITAMIN D3) 125 MCG
1000 CAPSULE ORAL DAILY
Refills: 0 | Status: DISCONTINUED | OUTPATIENT
Start: 2019-10-22 | End: 2019-10-25

## 2019-10-22 RX ORDER — MECLIZINE HCL 12.5 MG
25 TABLET ORAL EVERY 6 HOURS
Refills: 0 | Status: DISCONTINUED | OUTPATIENT
Start: 2019-10-22 | End: 2019-10-25

## 2019-10-22 RX ADMIN — Medication 25 MILLIGRAM(S): at 13:50

## 2019-10-22 RX ADMIN — Medication 50 MILLIGRAM(S): at 16:59

## 2019-10-22 RX ADMIN — Medication 81 MILLIGRAM(S): at 11:45

## 2019-10-22 RX ADMIN — Medication 1 MILLIGRAM(S): at 11:45

## 2019-10-22 RX ADMIN — ENOXAPARIN SODIUM 40 MILLIGRAM(S): 100 INJECTION SUBCUTANEOUS at 11:45

## 2019-10-22 RX ADMIN — Medication 1000 UNIT(S): at 11:45

## 2019-10-22 RX ADMIN — CHLORHEXIDINE GLUCONATE 1 APPLICATION(S): 213 SOLUTION TOPICAL at 05:25

## 2019-10-22 RX ADMIN — PANTOPRAZOLE SODIUM 40 MILLIGRAM(S): 20 TABLET, DELAYED RELEASE ORAL at 11:45

## 2019-10-22 RX ADMIN — Medication 25 MILLIGRAM(S): at 05:25

## 2019-10-22 NOTE — PROGRESS NOTE ADULT - SUBJECTIVE AND OBJECTIVE BOX
ARVIND CADENA 24y Male  MRN#: [id number]   Hospital Day: 2d    SUBJECTIVE  Patient is a 24y old Male who presents with a chief complaint of dizziness (21 Oct 2019 09:43)  Currently admitted to medicine with the primary diagnosis of Vertigo    INTERVAL HPI AND OVERNIGHT EVENTS:  Patient was examined and seen at bedside. This morning he is resting comfortably in bed and reports no issues or overnight events.    REVIEW OF SYMPTOMS:  CONSTITUTIONAL: No weakness, fevers or chills; No headaches  EYES: No visual changes, eye pain, or discharge  ENT: No vertigo; No ear pain or change in hearing; No sore throat or difficulty swallowing  NECK: No pain or stiffness  RESPIRATORY: No cough, wheezing, or hemoptysis; No shortness of breath  CARDIOVASCULAR: No chest pain or palpitations  GASTROINTESTINAL: No abdominal or epigastric pain; No nausea, vomiting, or hematemesis; No diarrhea or constipation; No melena or hematochezia  GENITOURINARY: No dysuria, frequency or hematuria  MUSCULOSKELETAL: No joint pain, no muscle pain, no weakness  NEUROLOGICAL: (+) left facial numbness and right lower extremity numbness  SKIN: No itching or rashes      OBJECTIVE  PAST MEDICAL & SURGICAL HISTORY  Vertigo    ALLERGIES:  No Known Allergies    MEDICATIONS:  STANDING MEDICATIONS  aspirin enteric coated 81 milliGRAM(s) Oral daily  chlorhexidine 4% Liquid 1 Application(s) Topical <User Schedule>  cholecalciferol 1000 Unit(s) Oral daily  enoxaparin Injectable 40 milliGRAM(s) SubCutaneous daily  folic acid 1 milliGRAM(s) Oral daily  methylPREDNISolone sodium succinate IVPB 1000 milliGRAM(s) IV Intermittent daily  pantoprazole    Tablet 40 milliGRAM(s) Oral daily    PRN MEDICATIONS  meclizine 25 milliGRAM(s) Oral every 6 hours PRN      VITAL SIGNS: Last 24 Hours  T(C): 37.1 (22 Oct 2019 13:30), Max: 37.1 (22 Oct 2019 13:30)  T(F): 98.8 (22 Oct 2019 13:30), Max: 98.8 (22 Oct 2019 13:30)  HR: 94 (22 Oct 2019 13:30) (64 - 94)  BP: 100/53 (22 Oct 2019 13:30) (100/53 - 131/60)  BP(mean): --  RR: 18 (22 Oct 2019 13:30) (18 - 18)  SpO2: --    LABS:                        14.8   6.72  )-----------( 212      ( 21 Oct 2019 05:22 )             45.1     10-21    142  |  104  |  16  ----------------------------<  108<H>  4.1   |  26  |  0.9    Ca    9.5      21 Oct 2019 05:22  Mg     2.1     10-21    TPro  6.3  /  Alb  4.2  /  TBili  0.6  /  DBili  x   /  AST  14  /  ALT  10  /  AlkPhos  43  10-21          Sedimentation Rate, Erythrocyte: 1 mm/Hr (10-22-19 @ 05:16)          RADIOLOGY:  < from: MR Head w/wo IV Cont (10.21.19 @ 10:27) >  INTERPRETATION:  Clinical History / Reason for exam: Vertigo, left facial   numbness, right lower extremity numbness. Rule out multiple sclerosis.    TECHNIQUE: MRI brain withand without contrast. Multiplanar   multisequential MRI of the brain was performed before and following the   intravenous administration of 7.5 cc Gadavist (0 cc discarded) on the 1.5   Tova magnet.     COMPARISON: Brain MRI 5/4/2017    FINDINGS:    Again seen are multiple foci of T2/FLAIR signal abnormality in the   cerebral hemispheric white matter in a pattern suggestive of   demyelination.    There are multiple new lesions, for example within the left parietal   periventricular/deep white matter on series 6 images 19-20, the left   parietal subcortical white matter on series 6 image 19, the left   precentral gyrus on series 6 image 26, the left basal ganglia on series 6   image 16, and the right corona radiata on series 6 image 20. Thesedo not   demonstrate contrast enhancement.    There is a new lesion within the left middle cerebellar   peduncle/brainstem, which demonstrates patchy contrast enhancement.    There is a redemonstrated callosal signal abnormality with a lesion   within the posterior body slightly more pronounced since the prior exam.    The ventricles and cortical sulci are normal in size and configuration.    There is no evidence of acute intracranial hemorrhage, extra-axial fluid   collection or midline shift.  There is no additional diffusion   abnormality to suggest acute/subacute infarct. There is normal flow void   present within the major vascular structures.      Patient is status post left scleral banding.      IMPRESSION:     1.  Redemonstrated multiple foci of white matter signal abnormality most   compatible with demyelinating disease. Since the prior exam of 12/24/2017:    2.  New enhancing lesion within the left middle cerebellar   peduncle/brainstem consistent with focus of active demyelination.    3.  Multiple new nonenhancing supratentorial lesions as described.    4.  Increased prominence of a lesion within the body of the corpus   callosum.    < end of copied text >      PHYSICAL EXAM:  CONSTITUTIONAL: No acute distress, well-developed, well-groomed, AAOx3  HEAD: Atraumatic, normocephalic  EYES: EOM intact, right pupil intact with direct and consensual pupillary light reflex, (+) left pupil is blown, conjunctiva and sclera clear  ENT: Supple, no masses, no thyromegaly, no bruits, no JVD; moist mucous membranes  PULMONARY: Clear to auscultation bilaterally; no wheezes, rales, or rhonchi  CARDIOVASCULAR: Regular rate and rhythm; no murmurs, rubs, or gallops  GASTROINTESTINAL: Soft, non-tender, non-distended; bowel sounds present  MUSCULOSKELETAL: 2+ peripheral pulses; no clubbing, no cyanosis, no edema  NEUROLOGY: non-focal; (+) unsteady gait when ambulating with left drift  SKIN: No rashes or lesions; warm and dry    ASSESSMENT & PLAN  Patient is a 25yo male with PMHx of vertigo, dizziness, and trauma to left pupil in 2009 secondary to paintball incident presenting with a 1-month history of dizziness. Patient's MRI brain on 12/24/2017 re-demonstrated white matter compatible with demyelinating disease, small enhancing lesion within left frontal coronal radiata, and also lesion within left aspect genu and left parietal lobe. He recently saw his neurologist Dr. Sweet in [place] who prescribed an MRI brain and C-spine for MS work up as per patient.    #Relapsing remitting multiple sclerosis  - Patient was being evaluated for possible multiple sclerosis outpatient  - Initial evaluation for MS began approximately 3 years ago  - Neurology consult appreciated  - MR Brain and C-spine: multiple foci of demyelinating disease consistent with multiple sclerosis  - Continue with aspirin 81mg PO QD  - Continue with meclizine 25mg PO Q8H PRN dizziness  - Continue with methylprednisolone 1g IV Q24H for 5 days (Day #2)  - Continue with pantoprazole 40mg PO QD    #Folate deficiency  - Continue with folate 1mg PO QD    #Misc  - DVT Prophylaxis: Lovenox 40mg SQ QD  - Diet: Regular  - GI Prophylaxis: pantoprazole 40mg PO QD  - Activity: Out of bed to chair  - IV Fluids: Encourage PO intake  - Code Status: Full Code    Dispo: From home

## 2019-10-22 NOTE — PHYSICAL THERAPY INITIAL EVALUATION ADULT - GAIT DISTANCE, PT EVAL
intermittent drift to the L side, occasional scissoring - pt is able to independently correct. Pt is able to negotiate curbs, ambulate on uneven surfaces including grass and inclines and self correct when needed

## 2019-10-22 NOTE — PROGRESS NOTE ADULT - ASSESSMENT
ASSESSMENT & PLAN  Patient is a 23yo male with PMHx of vertigo, dizziness, and trauma to left pupil in 2009 secondary to paintball incident presenting with a 1-month history of dizziness. Patient's MRI brain on 12/24/2017 re-demonstrated white matter compatible with demyelinating disease, small enhancing lesion within left frontal coronal radiata, and also lesion within left aspect genu and left parietal lobe. He recently saw his neurologist Dr. Sweet in [place] who prescribed an MRI brain and C-spine for MS work up as per patient.    #Multiple Sclerosis flair  - Solumedrol 1gm Q24 x5 days  -PPI  -PT  -outpt f/u with MS specialist    #Misc  - DVT Prophylaxis: Lovenox 40mg SQ QD  - Diet: Regular  - GI Prophylaxis: Not indicated  - Activity: Out of bed to chair  - IV Fluids: Encourage PO intake  - Code Status: Full Code      #Progress Note Handoff  Pending (specify):  Completion of Solumedrol course_____,Medical stability__x_______, PT____x____  Pt/Family discussion: Pt/father informed and agrees with the current plan  Disposition: Home___x___/SNF_______/4A________/To be determined________/Waiting for Auth_____ .     35 minutes spent on total encounter; more than 50% of the visit was spent counseling and/or coordinating care by the attending physician.

## 2019-10-22 NOTE — PROGRESS NOTE ADULT - SUBJECTIVE AND OBJECTIVE BOX
Patient is a 24y old  Male who presents with a chief complaint of dizziness (21 Oct 2019 09:43)    INTERVAL HPI/OVERNIGHT EVENTS: no complaints, feels better  ROS: Denies CP, SOB, AP, new weakness  All other systems reviewed and are within normal limits.  HPI:  24y M with PMH of vertigo  presenting with a 1 month history of  dizziness, Lt facial numbness.    Pt endorses dizziness that started at the beginning of the month. It was intermittent at first, but has become constant lasting the past 2 weeks. His dizziness is associated with intermittent headaches, L facial and RLE numbness, and is not associated with sudden head movements, or situational triggers. It gets worse when he lies flat. Pt also endorses sensation of "room spinning" around him, and intermittent tinnitus in his R ear, as well as R ear fullness/"heaviness". He saw a neurolgoist in [place] who referred him for an MRI Head and c-pine, which was scheduled for later this week.    Of note, L pupil is blown out from an old paintball injury; no new visual changes.    ED Course:  VS: /70 HR 71 T 97.2 satting 98% on RA. He received meclizine 25mg x1 w/ mild improvement in his symptoms, and 1L LR. (20 Oct 2019 23:06)    VERTIGO; LEG NUMBNESS; FACIAL NUMBNESS  ^VERTIGO; LEG NUMBNESS; FACIAL NUMBNESS  FH: hypertension  Handoff  MEWS Score  Vertigo  Vertigo  LIGHTHEADED/NUMBNESS/SORE THROAT  90+  Facial numbness  Leg numbness      General: NAD, AAO3  HEENT: PERRLA, EOMI, no LAD  CV: S1 S2  Resp: decreased breath sounds at bases  GI: NT/ND/S +BS  MS: no clubbing/cyanosis/edema, 2+ pulses b/l  Neuro: nonfocal, 2+reflexes thruout    MEDICATIONS  (STANDING):  aspirin enteric coated 81 milliGRAM(s) Oral daily  chlorhexidine 4% Liquid 1 Application(s) Topical <User Schedule>  cholecalciferol 1000 Unit(s) Oral daily  enoxaparin Injectable 40 milliGRAM(s) SubCutaneous daily  folic acid 1 milliGRAM(s) Oral daily  methylPREDNISolone sodium succinate IVPB 1000 milliGRAM(s) IV Intermittent daily  pantoprazole    Tablet 40 milliGRAM(s) Oral daily    MEDICATIONS  (PRN):  meclizine 25 milliGRAM(s) Oral every 6 hours PRN Dizziness    Home Medications:    Vital Signs Last 24 Hrs  T(C): 37.1 (22 Oct 2019 13:30), Max: 37.1 (22 Oct 2019 13:30)  T(F): 98.8 (22 Oct 2019 13:30), Max: 98.8 (22 Oct 2019 13:30)  HR: 94 (22 Oct 2019 13:30) (64 - 94)  BP: 100/53 (22 Oct 2019 13:30) (100/53 - 131/60)  BP(mean): --  RR: 18 (22 Oct 2019 13:30) (18 - 18)  SpO2: --  CAPILLARY BLOOD GLUCOSE        LABS:                        14.8   6.72  )-----------( 212      ( 21 Oct 2019 05:22 )             45.1     10-21    142  |  104  |  16  ----------------------------<  108<H>  4.1   |  26  |  0.9    Ca    9.5      21 Oct 2019 05:22  Mg     2.1     10-21    TPro  6.3  /  Alb  4.2  /  TBili  0.6  /  DBili  x   /  AST  14  /  ALT  10  /  AlkPhos  43  10-21    LIVER FUNCTIONS - ( 21 Oct 2019 05:22 )  Alb: 4.2 g/dL / Pro: 6.3 g/dL / ALK PHOS: 43 U/L / ALT: 10 U/L / AST: 14 U/L / GGT: x                           Chart, Consultant(s) Notes Reviewed:  [x ] YES  [ ] NO  Care Discussed with Consultants/Other Providers/ Housestaff [ x] YES  [ ] NO  Radiology, labs, old records personally reviewed.

## 2019-10-22 NOTE — PHYSICAL THERAPY INITIAL EVALUATION ADULT - GENERAL OBSERVATIONS, REHAB EVAL
Attempted to see pt for PT Initial Evaluation, pt is currently off unit for MRI. Will f/u for PT.
13:45-14:25 Pt encountered seated at EOB in NAD. Agreeable for PT. Pt reports still has numbness to L side of face
n/a

## 2019-10-23 LAB — ANA TITR SER: NEGATIVE — SIGNIFICANT CHANGE UP

## 2019-10-23 PROCEDURE — 99232 SBSQ HOSP IP/OBS MODERATE 35: CPT

## 2019-10-23 RX ORDER — LANOLIN ALCOHOL/MO/W.PET/CERES
5 CREAM (GRAM) TOPICAL AT BEDTIME
Refills: 0 | Status: DISCONTINUED | OUTPATIENT
Start: 2019-10-23 | End: 2019-10-24

## 2019-10-23 RX ADMIN — Medication 50 MILLIGRAM(S): at 15:31

## 2019-10-23 RX ADMIN — Medication 5 MILLIGRAM(S): at 22:14

## 2019-10-23 RX ADMIN — PANTOPRAZOLE SODIUM 40 MILLIGRAM(S): 20 TABLET, DELAYED RELEASE ORAL at 12:27

## 2019-10-23 RX ADMIN — Medication 1000 UNIT(S): at 12:28

## 2019-10-23 RX ADMIN — Medication 81 MILLIGRAM(S): at 12:28

## 2019-10-23 RX ADMIN — Medication 1 MILLIGRAM(S): at 12:28

## 2019-10-23 NOTE — PROGRESS NOTE ADULT - SUBJECTIVE AND OBJECTIVE BOX
ARVIND CADENA 24y Male  MRN#: [id number]   Hospital Day: 3d    SUBJECTIVE  Patient is a 24y old Male who presents with a chief complaint of dizziness (22 Oct 2019 16:05)  Currently admitted to medicine with the primary diagnosis of Vertigo    INTERVAL HPI AND OVERNIGHT EVENTS:  Patient was examined and seen at bedside. This morning he is resting comfortably in bed and reports no issues or overnight events.    REVIEW OF SYMPTOMS:  CONSTITUTIONAL: No weakness, fevers or chills; No headaches  EYES: No visual changes, eye pain, or discharge  ENT: No vertigo; No ear pain or change in hearing; No sore throat or difficulty swallowing  NECK: No pain or stiffness  RESPIRATORY: No cough, wheezing, or hemoptysis; No shortness of breath  CARDIOVASCULAR: No chest pain or palpitations  GASTROINTESTINAL: No abdominal or epigastric pain; No nausea, vomiting, or hematemesis; No diarrhea or constipation; No melena or hematochezia  GENITOURINARY: No dysuria, frequency or hematuria  MUSCULOSKELETAL: No joint pain, no muscle pain, no weakness  NEUROLOGICAL: No numbness or weakness  SKIN: No itching or rashes      OBJECTIVE  PAST MEDICAL & SURGICAL HISTORY  Vertigo    ALLERGIES:  No Known Allergies    MEDICATIONS:  STANDING MEDICATIONS  aspirin enteric coated 81 milliGRAM(s) Oral daily  chlorhexidine 4% Liquid 1 Application(s) Topical <User Schedule>  cholecalciferol 1000 Unit(s) Oral daily  enoxaparin Injectable 40 milliGRAM(s) SubCutaneous daily  folic acid 1 milliGRAM(s) Oral daily  methylPREDNISolone sodium succinate IVPB 1000 milliGRAM(s) IV Intermittent daily  pantoprazole    Tablet 40 milliGRAM(s) Oral daily    PRN MEDICATIONS  meclizine 25 milliGRAM(s) Oral every 6 hours PRN      VITAL SIGNS: Last 24 Hours  T(C): 35.8 (22 Oct 2019 21:23), Max: 37.1 (22 Oct 2019 13:30)  T(F): 96.5 (22 Oct 2019 21:23), Max: 98.8 (22 Oct 2019 13:30)  HR: 84 (22 Oct 2019 21:23) (84 - 94)  BP: 108/54 (22 Oct 2019 21:23) (100/53 - 108/54)  BP(mean): --  RR: 16 (22 Oct 2019 21:23) (16 - 18)  SpO2: --    LABS:                        RADIOLOGY:  < from: MR Head w/wo IV Cont (10.21.19 @ 10:27) >  IMPRESSION:     1.  Redemonstrated multiple foci of white matter signal abnormality most   compatible with demyelinating disease. Since the prior exam of 12/24/2017:    2.  New enhancing lesion within the left middle cerebellar   peduncle/brainstem consistent with focus of active demyelination.    3.  Multiple new nonenhancing supratentorial lesions as described.    4.  Increased prominence of a lesion within the body of the corpus   callosum.    < end of copied text >      PHYSICAL EXAM:  CONSTITUTIONAL: No acute distress, well-developed, well-groomed, AAOx3  HEAD: Atraumatic, normocephalic  EYES: EOM intact, PERRLA, conjunctiva and sclera clear  ENT: Supple, no masses, no thyromegaly, no bruits, no JVD; moist mucous membranes  PULMONARY: Clear to auscultation bilaterally; no wheezes, rales, or rhonchi  CARDIOVASCULAR: Regular rate and rhythm; no murmurs, rubs, or gallops  GASTROINTESTINAL: Soft, non-tender, non-distended; bowel sounds present  MUSCULOSKELETAL: 2+ peripheral pulses; no clubbing, no cyanosis, no edema  NEUROLOGY: non-focal  SKIN: No rashes or lesions; warm and dry    ASSESSMENT & PLAN  Patient is a 23yo male with PMHx of vertigo, dizziness, and trauma to left pupil in 2009 secondary to paintball incident presenting with a 1-month history of dizziness. Patient's MRI brain on 12/24/2017 re-demonstrated white matter compatible with demyelinating disease, small enhancing lesion within left frontal coronal radiata, and also lesion within left aspect genu and left parietal lobe. He recently saw his neurologist Dr. Sweet in [place] who prescribed an MRI brain and C-spine for MS work up as per patient.    #Relapsing remitting multiple sclerosis flair  - Patient was being evaluated for possible multiple sclerosis outpatient  - Initial evaluation for MS began approximately 3 years ago  - Neurology consult appreciated  - MR Brain and C-spine: multiple foci of demyelinating disease consistent with multiple sclerosis  - Continue with aspirin 81mg PO QD  - Continue with meclizine 25mg PO Q8H PRN dizziness  - Continue with methylprednisolone 1g IV Q24H for 5 days (Day #3)  - Continue with pantoprazole 40mg PO QD  - Continue with PT/rehab  - Follow up outpatient with MS specialist to start disease-modifying therapy    #Folate deficiency  - Continue with folate 1mg PO QD    #Misc  - DVT Prophylaxis: Lovenox 40mg SQ QD  - Diet: Regular  - GI Prophylaxis: pantoprazole 40mg PO QD  - Activity: Out of bed to chair  - IV Fluids: Encourage PO intake  - Code Status: Full Code    Dispo: Acute ARVIND CADENA 24y Male  MRN#: [id number]   Hospital Day: 3d    SUBJECTIVE  Patient is a 24y old Male who presents with a chief complaint of dizziness (22 Oct 2019 16:05)  Currently admitted to medicine with the primary diagnosis of Vertigo    INTERVAL HPI AND OVERNIGHT EVENTS:  Patient was examined and seen at bedside. This morning he is resting comfortably in bed and reports no issues or overnight events.    REVIEW OF SYMPTOMS:  CONSTITUTIONAL: No weakness, fevers or chills; No headaches  EYES: No visual changes, eye pain, or discharge  ENT: No vertigo; No ear pain or change in hearing; No sore throat or difficulty swallowing  NECK: No pain or stiffness  RESPIRATORY: No cough, wheezing, or hemoptysis; No shortness of breath  CARDIOVASCULAR: No chest pain or palpitations  GASTROINTESTINAL: No abdominal or epigastric pain; No nausea, vomiting, or hematemesis; No diarrhea or constipation; No melena or hematochezia  GENITOURINARY: No dysuria, frequency or hematuria  MUSCULOSKELETAL: No joint pain, no muscle pain, no weakness  NEUROLOGICAL: No numbness or weakness  SKIN: No itching or rashes      OBJECTIVE  PAST MEDICAL & SURGICAL HISTORY  Vertigo    ALLERGIES:  No Known Allergies    MEDICATIONS:  STANDING MEDICATIONS  aspirin enteric coated 81 milliGRAM(s) Oral daily  chlorhexidine 4% Liquid 1 Application(s) Topical <User Schedule>  cholecalciferol 1000 Unit(s) Oral daily  enoxaparin Injectable 40 milliGRAM(s) SubCutaneous daily  folic acid 1 milliGRAM(s) Oral daily  methylPREDNISolone sodium succinate IVPB 1000 milliGRAM(s) IV Intermittent daily  pantoprazole    Tablet 40 milliGRAM(s) Oral daily    PRN MEDICATIONS  meclizine 25 milliGRAM(s) Oral every 6 hours PRN      VITAL SIGNS: Last 24 Hours  T(C): 35.8 (22 Oct 2019 21:23), Max: 37.1 (22 Oct 2019 13:30)  T(F): 96.5 (22 Oct 2019 21:23), Max: 98.8 (22 Oct 2019 13:30)  HR: 84 (22 Oct 2019 21:23) (84 - 94)  BP: 108/54 (22 Oct 2019 21:23) (100/53 - 108/54)  BP(mean): --  RR: 16 (22 Oct 2019 21:23) (16 - 18)  SpO2: --    LABS:      RADIOLOGY:  < from: MR Head w/wo IV Cont (10.21.19 @ 10:27) >  IMPRESSION:     1.  Redemonstrated multiple foci of white matter signal abnormality most   compatible with demyelinating disease. Since the prior exam of 12/24/2017:    2.  New enhancing lesion within the left middle cerebellar   peduncle/brainstem consistent with focus of active demyelination.    3.  Multiple new nonenhancing supratentorial lesions as described.    4.  Increased prominence of a lesion within the body of the corpus   callosum.    < end of copied text >      PHYSICAL EXAM:  CONSTITUTIONAL: No acute distress, well-developed, well-groomed, AAOx3  HEAD: Atraumatic, normocephalic  EYES: EOM intact, PERRLA, conjunctiva and sclera clear  ENT: Supple, no masses, no thyromegaly, no bruits, no JVD; moist mucous membranes  PULMONARY: Clear to auscultation bilaterally; no wheezes, rales, or rhonchi  CARDIOVASCULAR: Regular rate and rhythm; no murmurs, rubs, or gallops  GASTROINTESTINAL: Soft, non-tender, non-distended; bowel sounds present  MUSCULOSKELETAL: 2+ peripheral pulses; no clubbing, no cyanosis, no edema  NEUROLOGY: non-focal, (+) leftward gait disturbance  SKIN: No rashes or lesions; warm and dry    ASSESSMENT & PLAN  Patient is a 23yo male with PMHx of vertigo, dizziness, and trauma to left pupil in 2009 secondary to paintball incident presenting with a 1-month history of dizziness. Patient's MRI brain on 12/24/2017 re-demonstrated white matter compatible with demyelinating disease, small enhancing lesion within left frontal coronal radiata, and also lesion within left aspect genu and left parietal lobe. He recently saw his neurologist Dr. Sweet in [place] who prescribed an MRI brain and C-spine for MS work up as per patient.    #Relapsing remitting multiple sclerosis flair  - Patient was being evaluated for possible multiple sclerosis outpatient  - Initial evaluation for MS began approximately 3 years ago  - Neurology consult appreciated  - MR Brain and C-spine: multiple foci of demyelinating disease consistent with multiple sclerosis  - Continue with aspirin 81mg PO QD  - Continue with meclizine 25mg PO Q8H PRN dizziness  - Continue with methylprednisolone 1g IV Q24H for 5 days (Day #3)  - Continue with pantoprazole 40mg PO QD  - Continue with PT/rehab  - Follow up outpatient with MS specialist to start disease-modifying therapy    #Folate deficiency  - Continue with folate 1mg PO QD    #Misc  - DVT Prophylaxis: Lovenox 40mg SQ QD  - Diet: Regular  - GI Prophylaxis: pantoprazole 40mg PO QD  - Activity: Out of bed to chair  - IV Fluids: Encourage PO intake  - Code Status: Full Code    Dispo: Acute

## 2019-10-23 NOTE — PROGRESS NOTE ADULT - SUBJECTIVE AND OBJECTIVE BOX
Patient is a 24y old  Male who presents with a chief complaint of dizziness (21 Oct 2019 09:43)    INTERVAL HPI/OVERNIGHT EVENTS: no complaints, feels better  ROS: Denies CP, SOB, AP, new weakness  All other systems reviewed and are within normal limits.  HPI:  24y M with PMH of vertigo  presenting with a 1 month history of  dizziness, Lt facial numbness.    Pt endorses dizziness that started at the beginning of the month. It was intermittent at first, but has become constant lasting the past 2 weeks. His dizziness is associated with intermittent headaches, L facial and RLE numbness, and is not associated with sudden head movements, or situational triggers. It gets worse when he lies flat. Pt also endorses sensation of "room spinning" around him, and intermittent tinnitus in his R ear, as well as R ear fullness/"heaviness". He saw a neurolgoist in [place] who referred him for an MRI Head and c-pine, which was scheduled for later this week.    Of note, L pupil is blown out from an old paintball injury; no new visual changes.    ED Course:  VS: /70 HR 71 T 97.2 satting 98% on RA. He received meclizine 25mg x1 w/ mild improvement in his symptoms, and 1L LR. (20 Oct 2019 23:06)    VERTIGO; LEG NUMBNESS; FACIAL NUMBNESS  ^VERTIGO; LEG NUMBNESS; FACIAL NUMBNESS  FH: hypertension  Handoff  MEWS Score  Vertigo  Vertigo  LIGHTHEADED/NUMBNESS/SORE THROAT  90+  Facial numbness  Leg numbness      General: NAD, AAO3  HEENT: PERRLA, EOMI, no LAD  CV: S1 S2  Resp: decreased breath sounds at bases  GI: NT/ND/S +BS  MS: no clubbing/cyanosis/edema, 2+ pulses b/l  Neuro: nonfocal, 2+reflexes thruout            MEDICATIONS  (STANDING):  aspirin enteric coated 81 milliGRAM(s) Oral daily  chlorhexidine 4% Liquid 1 Application(s) Topical <User Schedule>  cholecalciferol 1000 Unit(s) Oral daily  enoxaparin Injectable 40 milliGRAM(s) SubCutaneous daily  folic acid 1 milliGRAM(s) Oral daily  methylPREDNISolone sodium succinate IVPB 1000 milliGRAM(s) IV Intermittent daily  pantoprazole    Tablet 40 milliGRAM(s) Oral daily    MEDICATIONS  (PRN):  meclizine 25 milliGRAM(s) Oral every 6 hours PRN Dizziness    Home Medications:    Vital Signs Last 24 Hrs  T(C): 35.9 (23 Oct 2019 13:13), Max: 36.4 (23 Oct 2019 07:13)  T(F): 96.7 (23 Oct 2019 13:13), Max: 97.6 (23 Oct 2019 07:13)  HR: 99 (23 Oct 2019 13:13) (78 - 99)  BP: 139/62 (23 Oct 2019 13:13) (108/54 - 139/62)  BP(mean): --  RR: 18 (23 Oct 2019 13:13) (16 - 18)  SpO2: --  CAPILLARY BLOOD GLUCOSE        LABS:                            Consultant Notes Reviewed:  [x ] YES  [ ] NO  Care Discussed with Consultants/Other Providers/ Housestaff [ x] YES  [ ] NO  Radiology, labs, new studies personally reviewed.

## 2019-10-23 NOTE — PROGRESS NOTE ADULT - ASSESSMENT
ASSESSMENT & PLAN  Patient is a 25yo male with PMHx of vertigo, dizziness, and trauma to left pupil in 2009 secondary to paintball incident presenting with a 1-month history of dizziness. Patient's MRI brain on 12/24/2017 re-demonstrated white matter compatible with demyelinating disease, small enhancing lesion within left frontal coronal radiata, and also lesion within left aspect genu and left parietal lobe. He recently saw his neurologist Dr. Sweet in [place] who prescribed an MRI brain and C-spine for MS work up as per patient.    #Multiple Sclerosis flair  - Solumedrol 1gm Q24 x5 days  -PPI  -PT  -outpt f/u with MS specialist    #Misc  - DVT Prophylaxis: Lovenox 40mg SQ QD  - Diet: Regular  - GI Prophylaxis: Not indicated  - Activity: Out of bed to chair  - IV Fluids: Encourage PO intake  - Code Status: Full Code      #Progress Note Handoff  Pending (specify):  Completion of Solumedrol course_____,Medical stability__x_______, PT____x____  Pt/Family discussion: Pt/father informed and agrees with the current plan  Disposition: Home___x___/SNF_______/4A________/To be determined________/Waiting for Auth_____ .     25 minutes spent on total encounter; more than 50% of the visit was spent counseling and/or coordinating care by the attending physician.

## 2019-10-23 NOTE — PROGRESS NOTE ADULT - ATTENDING COMMENTS
Patient seen and examined independently earlier today. Case discussed with housestaff, nursing, social work, patient, father, neuro. I agree with most of the resident's note, physical exam, and plan except as below. Patient c/o Lt facial paresthesias and unsteadiness. No other complaints. Remainder ROS unremarkable. D/w neuro the MRI findings s/o MS, will start on Solumedrol 1gm q24 x5days. Cont PT, PPI.      #Progress Note Handoff  Pending (specify):  Completion of Solumedrol xcourse_____,Medical stability__x_______, PT____x____  Pt/Family discussion: Pt/father informed and agrees with the current plan  Disposition: Home___x___/SNF_______/4A________/To be determined________/Waiting for Auth_____
I have personally seen and examined this patient.  I have fully participated in the care of this patient.  I have reviewed all pertinent clinical information, including history, physical exam, plan and note.   I have reviewed all pertinent clinical information and reviewed all relevant imaging and diagnostic studies personally.  25 yo M w/ radiographically/clinically proven RR-MS currently on solumedrol.  Recommendations as above.
I have personally seen and examined this patient.  I have fully participated in the care of this patient.  I have reviewed all pertinent clinical information, influding history, physical exam, plan and note.   I have reviewed all pertinent clinical information and reviewed all relevant imaging and diagnostic studies personally.  25 yo M w/ relapsying -remitting symptoms with neuroimaging c/w progression of demyelinating plaques with increased disease burden off DMTs over the last 3 years.  Recommend treat for acute MS attack but needs adequate f/u to initiate DMTs as outpt.  Pt understands this and will f/u with Dr. Richards.  In addition, recommend f/u with MS specialist (Dr. Carola Traylor) as well.  Will treat with IV solumedrol for now.  recommendations as above.

## 2019-10-23 NOTE — PROGRESS NOTE ADULT - SUBJECTIVE AND OBJECTIVE BOX
Neurology Progress Note    Interval History:      HPI:  24y M with PMH of vertigo  presenting with a 1 month history of  dizziness.    Pt endorses dizziness that started at the beginning of the month. It was intermittent at first, but has become constant lasting the past 2 weeks. His dizziness is associated with intermittent headaches, L facial and RLE numbness, and is not associated with sudden head movements, or situational triggers. It gets worse when he lies flat. Pt also endorses sensation of "room spinning" around him, and intermittent tinnitus in his R ear, as well as R ear fullness/"heaviness". He saw a neurolgoist in [place] who referred him for an MRI Head and c-pine, which was scheduled for later this week.    Of note, L pupil is blown out from an old paintball injury; no new visual changes.    ED Course:  VS: /70 HR 71 T 97.2 satting 98% on RA. He received meclizine 25mg x1 w/ mild improvement in his symptoms, and 1L LR. (20 Oct 2019 23:06)      PAST MEDICAL & SURGICAL HISTORY:  Vertigo          Medications:  aspirin enteric coated 81 milliGRAM(s) Oral daily  chlorhexidine 4% Liquid 1 Application(s) Topical <User Schedule>  cholecalciferol 1000 Unit(s) Oral daily  enoxaparin Injectable 40 milliGRAM(s) SubCutaneous daily  folic acid 1 milliGRAM(s) Oral daily  meclizine 25 milliGRAM(s) Oral every 6 hours PRN  methylPREDNISolone sodium succinate IVPB 1000 milliGRAM(s) IV Intermittent daily  pantoprazole    Tablet 40 milliGRAM(s) Oral daily      Vital Signs Last 24 Hrs  T(C): 35.9 (23 Oct 2019 13:13), Max: 36.4 (23 Oct 2019 07:13)  T(F): 96.7 (23 Oct 2019 13:13), Max: 97.6 (23 Oct 2019 07:13)  HR: 99 (23 Oct 2019 13:13) (78 - 99)  BP: 139/62 (23 Oct 2019 13:13) (108/54 - 139/62)  BP(mean): --  RR: 18 (23 Oct 2019 13:13) (16 - 18)  SpO2: --    Neurological Exam:   Mental status: Awake, alert and oriented x3.  Recent and remote memory intact.  Naming, repetition and comprehension intact.  Attention/concentration intact.  No dysarthria, no aphasia.  Fund of knowledge appropriate.    Cranial nerves: Pupils equally round and reactive to light, visual fields full, no nystagmus, extraocular muscles intact, V1 through V3 intact bilaterally and symmetric, face symmetric, hearing intact to finger rub, palate elevation symmetric, tongue was midline.  Motor:  MRC grading 5/5 b/l UE/LE.   strength 5/5.  Normal tone and bulk.  No abnormal movements.    Sensation: Intact to light touch, proprioception, and pinprick.   Coordination: No dysmetria on finger-to-nose and heel-to-shin.  No dysdiadokinesia.  Reflexes: 2+ in bilateral UE/LE, downgoing toes bilaterally. (-) Romero.  Gait: Narrow and slightly deviates to left. No ataxia.  Romberg negative    Labs: Neurology Progress Note    Interval History:  Currently feeling somewhat better.  Able to ambulate independently normally but has occasional off balance when sitting.  L face numbness persistent.  Denies any new focal deficits.  Tolerating IV solumedrol but had insomnia last night.      HPI:  24y M with PMH of vertigo  presenting with a 1 month history of  dizziness.    Pt endorses dizziness that started at the beginning of the month. It was intermittent at first, but has become constant lasting the past 2 weeks. His dizziness is associated with intermittent headaches, L facial and RLE numbness, and is not associated with sudden head movements, or situational triggers. It gets worse when he lies flat. Pt also endorses sensation of "room spinning" around him, and intermittent tinnitus in his R ear, as well as R ear fullness/"heaviness". He saw a neurolgoist in [place] who referred him for an MRI Head and c-pine, which was scheduled for later this week.    Of note, L pupil is blown out from an old paintball injury; no new visual changes.    ED Course:  VS: /70 HR 71 T 97.2 satting 98% on RA. He received meclizine 25mg x1 w/ mild improvement in his symptoms, and 1L LR. (20 Oct 2019 23:06)    PAST MEDICAL & SURGICAL HISTORY:  Vertigo    Medications:  aspirin enteric coated 81 milliGRAM(s) Oral daily  chlorhexidine 4% Liquid 1 Application(s) Topical <User Schedule>  cholecalciferol 1000 Unit(s) Oral daily  enoxaparin Injectable 40 milliGRAM(s) SubCutaneous daily  folic acid 1 milliGRAM(s) Oral daily  meclizine 25 milliGRAM(s) Oral every 6 hours PRN  methylPREDNISolone sodium succinate IVPB 1000 milliGRAM(s) IV Intermittent daily  pantoprazole    Tablet 40 milliGRAM(s) Oral daily    Vital Signs Last 24 Hrs  T(C): 35.9 (23 Oct 2019 13:13), Max: 36.4 (23 Oct 2019 07:13)  T(F): 96.7 (23 Oct 2019 13:13), Max: 97.6 (23 Oct 2019 07:13)  HR: 99 (23 Oct 2019 13:13) (78 - 99)  BP: 139/62 (23 Oct 2019 13:13) (108/54 - 139/62)  BP(mean): --  RR: 18 (23 Oct 2019 13:13) (16 - 18)  SpO2: --    Neurological Exam:   Mental status: Awake, alert and oriented x3.  Recent and remote memory intact.  Naming, repetition and comprehension intact.  Attention/concentration intact.  No dysarthria, no aphasia.  Fund of knowledge appropriate.    Cranial nerves: Pupils equally round and reactive to light, visual fields full, no nystagmus, extraocular muscles intact, V1 through V3 intact bilaterally and symmetric, face symmetric, hearing intact to finger rub, palate elevation symmetric, tongue was midline.  Motor:  MRC grading 5/5 b/l UE/LE.   strength 5/5.  Normal tone and bulk.  No abnormal movements.    Sensation: Intact to light touch, proprioception, and pinprick.   Coordination: No dysmetria on finger-to-nose and heel-to-shin.  No dysdiadokinesia.  Reflexes: 2+ in bilateral UE/LE, downgoing toes bilaterally. (-) Romero.  Gait: Narrow and slightly deviates to left. No ataxia.  Romberg negative

## 2019-10-23 NOTE — PROGRESS NOTE ADULT - ASSESSMENT
24y M with PMH of vertigo  presenting with a 1 month history of  dizziness.    #Multiple Sclerosis, relapsing remitting   - Continue Solumedrol 1000 mg daily for 5-day course (today is day 3)  -Follow-up Vitamin D, NMO antibody, MOG antibody. Folate low, replete.   -Follow-up at [facility] with MS specialist 24y M with PMH of vertigo  presenting with a 1 month history of  dizziness found with worsening demyelination on f/u MRI c/w RR-MS on solumedrol.     #Multiple Sclerosis, relapsing remitting   - Continue Solumedrol 1000 mg daily for 5-day course (today is day 3)  -Follow-up Vitamin D, NMO antibody, MOG antibody. Folate low, replete.   -Follow-up at [facility] with MS specialist once discharged

## 2019-10-24 LAB — ACE SERPL-CCNC: 65 U/L — SIGNIFICANT CHANGE UP (ref 14–82)

## 2019-10-24 PROCEDURE — 99232 SBSQ HOSP IP/OBS MODERATE 35: CPT

## 2019-10-24 RX ORDER — CHOLECALCIFEROL (VITAMIN D3) 125 MCG
1 CAPSULE ORAL
Qty: 30 | Refills: 0
Start: 2019-10-24 | End: 2019-11-22

## 2019-10-24 RX ORDER — FOLIC ACID 0.8 MG
1 TABLET ORAL
Qty: 30 | Refills: 0
Start: 2019-10-24 | End: 2019-11-22

## 2019-10-24 RX ORDER — LANOLIN ALCOHOL/MO/W.PET/CERES
5 CREAM (GRAM) TOPICAL
Refills: 0 | Status: DISCONTINUED | OUTPATIENT
Start: 2019-10-24 | End: 2019-10-25

## 2019-10-24 RX ADMIN — PANTOPRAZOLE SODIUM 40 MILLIGRAM(S): 20 TABLET, DELAYED RELEASE ORAL at 11:23

## 2019-10-24 RX ADMIN — Medication 50 MILLIGRAM(S): at 15:08

## 2019-10-24 RX ADMIN — Medication 81 MILLIGRAM(S): at 11:25

## 2019-10-24 RX ADMIN — Medication 5 MILLIGRAM(S): at 21:42

## 2019-10-24 RX ADMIN — Medication 1 MILLIGRAM(S): at 11:23

## 2019-10-24 RX ADMIN — Medication 1000 UNIT(S): at 11:23

## 2019-10-24 NOTE — PROGRESS NOTE ADULT - ASSESSMENT
ASSESSMENT & PLAN  Patient is a 25yo male with PMHx of vertigo, dizziness, and trauma to left pupil in 2009 secondary to paintball incident presenting with a 1-month history of dizziness. Patient's MRI brain on 12/24/2017 re-demonstrated white matter compatible with demyelinating disease, small enhancing lesion within left frontal coronal radiata, and also lesion within left aspect genu and left parietal lobe. He recently saw his neurologist Dr. Sweet in [place] who prescribed an MRI brain and C-spine for MS work up as per patient.    #Multiple Sclerosis flair  - Solumedrol 1gm Q24 x5 days  -PPI  -PT  -outpt f/u with MS specialist    #Misc  - DVT Prophylaxis: Lovenox 40mg SQ QD  - Diet: Regular  - GI Prophylaxis: Not indicated  - Activity: Out of bed to chair  - IV Fluids: Encourage PO intake  - Code Status: Full Code      #Progress Note Handoff  Pending (specify):  Completion of Solumedrol course_____,Medical stability__x_______, PT____x____  Pt/Family discussion: Pt/father informed and agrees with the current plan  Disposition: Home___x___/SNF_______/4A________/To be determined________/Waiting for Auth_____ .     25 minutes spent on total encounter; more than 50% of the visit was spent counseling and/or coordinating care by the attending physician.

## 2019-10-24 NOTE — DISCHARGE NOTE PROVIDER - PROVIDER TOKENS
FREE:[LAST:[Layton],FIRST:[Carola],PHONE:[phone]],FAX:[(   )    -],ADDRESS:[[facility] Multiple Sclerosis specialist]] FREE:[LAST:[Layton],FIRST:[Carola],PHONE:[phone]],FAX:[(   )    -],ADDRESS:[[facility] Multiple Sclerosis specialist]],PROVIDER:[TOKEN:[[id number]],FOLLOWUP:[2 weeks]],PROVIDER:[TOKEN:[[id number]],FOLLOWUP:[Routine]] PROVIDER:[TOKEN:[[id number]],FOLLOWUP:[2 weeks]],PROVIDER:[TOKEN:[[id number]],FOLLOWUP:[Routine]],PROVIDER:[TOKEN:[[id number]:[id number]],FOLLOWUP:[1 month]]

## 2019-10-24 NOTE — PROGRESS NOTE ADULT - SUBJECTIVE AND OBJECTIVE BOX
Patient is a 24y old  Male who presents with a chief complaint of dizziness (21 Oct 2019 09:43)    INTERVAL HPI/OVERNIGHT EVENTS: no complaints, feels better, still Lt facial numbness, gait is better  ROS: Denies CP, SOB, AP, new weakness  All other systems reviewed and are within normal limits.  HPI:  24y M with PMH of vertigo  presenting with a 1 month history of  dizziness, Lt facial numbness.    Pt endorses dizziness that started at the beginning of the month. It was intermittent at first, but has become constant lasting the past 2 weeks. His dizziness is associated with intermittent headaches, L facial and RLE numbness, and is not associated with sudden head movements, or situational triggers. It gets worse when he lies flat. Pt also endorses sensation of "room spinning" around him, and intermittent tinnitus in his R ear, as well as R ear fullness/"heaviness". He saw a neurolgoist in [place] who referred him for an MRI Head and c-pine, which was scheduled for later this week.    Of note, L pupil is blown out from an old paintball injury; no new visual changes.    ED Course:  VS: /70 HR 71 T 97.2 satting 98% on RA. He received meclizine 25mg x1 w/ mild improvement in his symptoms, and 1L LR. (20 Oct 2019 23:06)    VERTIGO; LEG NUMBNESS; FACIAL NUMBNESS  ^VERTIGO; LEG NUMBNESS; FACIAL NUMBNESS  FH: hypertension  Handoff  MEWS Score  Vertigo  Vertigo  LIGHTHEADED/NUMBNESS/SORE THROAT  90+  Facial numbness  Leg numbness      General: NAD, AAO3  HEENT: PERRLA, EOMI, no LAD  CV: S1 S2  Resp: decreased breath sounds at bases  GI: NT/ND/S +BS  MS: no clubbing/cyanosis/edema, 2+ pulses b/l  Neuro: nonfocal, 2+reflexes thruout            MEDICATIONS  (STANDING):  aspirin enteric coated 81 milliGRAM(s) Oral daily  chlorhexidine 4% Liquid 1 Application(s) Topical <User Schedule>  cholecalciferol 1000 Unit(s) Oral daily  enoxaparin Injectable 40 milliGRAM(s) SubCutaneous daily  folic acid 1 milliGRAM(s) Oral daily  methylPREDNISolone sodium succinate IVPB 1000 milliGRAM(s) IV Intermittent daily  pantoprazole    Tablet 40 milliGRAM(s) Oral daily    MEDICATIONS  (PRN):  meclizine 25 milliGRAM(s) Oral every 6 hours PRN Dizziness  melatonin 5 milliGRAM(s) Oral <User Schedule> PRN Sleep    Home Medications:    Vital Signs Last 24 Hrs  T(C): 35.9 (24 Oct 2019 05:06), Max: 35.9 (24 Oct 2019 05:06)  T(F): 96.6 (24 Oct 2019 05:06), Max: 96.6 (24 Oct 2019 05:06)  HR: 69 (24 Oct 2019 05:06) (69 - 69)  BP: 131/73 (24 Oct 2019 05:06) (103/63 - 131/73)  BP(mean): --  RR: 19 (24 Oct 2019 05:06) (16 - 19)  SpO2: --  CAPILLARY BLOOD GLUCOSE        LABS:                            Consultant Notes Reviewed:  [x ] YES  [ ] NO  Care Discussed with Consultants/Other Providers/ Housestaff [ x] YES  [ ] NO  Radiology, labs, new studies personally reviewed.

## 2019-10-24 NOTE — DISCHARGE NOTE PROVIDER - CARE PROVIDERS DIRECT ADDRESSES
,DirectAddress_Unknown ,DirectAddress_Unknown,[email or web address],DirectAddress_Unknown

## 2019-10-24 NOTE — PROGRESS NOTE ADULT - NSHPATTENDINGPLANDISCUSS_GEN_ALL_CORE
Housestaff, nursing, social work, neuro, father
Housestaff, nursing, social work, neuro
Housestaff, nursing, social work, parents, PT
hospitalist
hospitalist

## 2019-10-24 NOTE — DISCHARGE NOTE PROVIDER - NSDCCPCAREPLAN_GEN_ALL_CORE_FT
PRINCIPAL DISCHARGE DIAGNOSIS  Diagnosis: Multiple sclerosis  Assessment and Plan of Treatment: You presented with worsening vertigo and other numbness and weakness in body that were concerning for underlying neurological condition. Multiple sclerosis had been suspected from prior scans. MRI done here showed new acute enhancing lesions in addition to old ones. Patient was treated with 5 days of IV steroids and improved.   - follow up with your neurologist Dr. [place] in [place]   - Multiple sclerosis, specialist Dr. Carola Traylor @ [facility] [phone]      SECONDARY DISCHARGE DIAGNOSES  Diagnosis: Folic acid deficiency  Assessment and Plan of Treatment: While here it was noted that your folate level was low. Continue with the supplement, follow up with your primary. PRINCIPAL DISCHARGE DIAGNOSIS  Diagnosis: Multiple sclerosis  Assessment and Plan of Treatment: You presented with worsening vertigo and other numbness and weakness in body that were concerning for underlying neurological condition. Multiple sclerosis had been suspected from prior scans. MRI done here showed new acute enhancing lesions in addition to old ones. Patient was treated with 5 days of IV steroids and improved.    - Multiple sclerosis, specialist Dr. Carola Traylor @ [facility] [phone] or Dr. Solomon Ortega at [facility]       SECONDARY DISCHARGE DIAGNOSES  Diagnosis: Folic acid deficiency  Assessment and Plan of Treatment: While here it was noted that your folate level was low. Continue with the supplement, follow up with your primary.

## 2019-10-24 NOTE — DISCHARGE NOTE PROVIDER - HOSPITAL COURSE
25yo male with PMHx of vertigo, dizziness, and trauma to left pupil in 2009 secondary to paintball incident presenting with a 1-month history of dizziness. Patient's MRI brain on 12/24/2017 re-demonstrated white matter compatible with demyelinating disease, small enhancing lesion within left frontal coronal radiata, and also lesion within left aspect genu and left parietal lobe. He recently saw his neurologist Dr. [place] in [place] who prescribed an MRI brain and C-spine for MS work up as per patient.        Patient had MRI that showed new acute enhancing lesions in addition to old ones. Patient was treated with 5 days of IV steroids and improved. To discharge with follow up to his neurologist in [place] and a MS specialist at [facility].

## 2019-10-24 NOTE — DISCHARGE NOTE PROVIDER - CARE PROVIDER_API CALL
Carola Traylor  [facility] Multiple Sclerosis specialist  Phone: [phone]  Fax: (   )    -  Follow Up Time: Carola Traylor  [facility] Multiple Sclerosis specialist  Phone: [phone]  Fax: (   )    -  Follow Up Time:     Obed Ortega)  Neurology  [address], [address] [place], [place] [address]  Phone: [phone]  Fax: [phone]  Follow Up Time: 2 weeks    Chaka Baumann)  Neuromuscular Medicine  [address] [place] [address], [address]  [place], [place] [id number]  Phone: [phone]  Fax: [phone]  Follow Up Time: Routine Obed Ortega)  Neurology  [address] [address]  Phone: [phone]  Fax: [phone]  Follow Up Time: 2 weeks    Chaka Baumann)  Neuromuscular Medicine  [address] [place] [address], [facility] [address]  [place], [place] [id number]  Phone: [phone]  Fax: [phone]  Follow Up Time: Routine    [facility] [place]  Follow Up Time: 1 month

## 2019-10-24 NOTE — PROGRESS NOTE ADULT - SUBJECTIVE AND OBJECTIVE BOX
ARVIND CADENA 24y Male  MRN#: [id number]   Hospital Day: 4d    SUBJECTIVE  Patient is a 24y old Male who presents with a chief complaint of dizziness (23 Oct 2019 06:49)  Currently admitted to medicine with the primary diagnosis of Vertigo    INTERVAL HPI AND OVERNIGHT EVENTS:  Patient was examined and seen at bedside. This morning he is resting comfortably in bed and reports no issues or overnight events. He states that his walking is more stable than before. However, the numbness is still present in his face.    REVIEW OF SYMPTOMS:  CONSTITUTIONAL: No weakness, fevers or chills; No headaches  EYES: No visual changes, eye pain, or discharge  ENT: No vertigo; No ear pain or change in hearing; No sore throat or difficulty swallowing  NECK: No pain or stiffness  RESPIRATORY: No cough, wheezing, or hemoptysis; No shortness of breath  CARDIOVASCULAR: No chest pain or palpitations  GASTROINTESTINAL: No abdominal or epigastric pain; No nausea, vomiting, or hematemesis; No diarrhea or constipation; No melena or hematochezia  GENITOURINARY: No dysuria, frequency or hematuria  MUSCULOSKELETAL: No joint pain, no muscle pain, no weakness  NEUROLOGICAL: (+) numbness in left face  SKIN: No itching or rashes      OBJECTIVE  PAST MEDICAL & SURGICAL HISTORY  Vertigo    ALLERGIES:  No Known Allergies    MEDICATIONS:  STANDING MEDICATIONS  aspirin enteric coated 81 milliGRAM(s) Oral daily  chlorhexidine 4% Liquid 1 Application(s) Topical <User Schedule>  cholecalciferol 1000 Unit(s) Oral daily  enoxaparin Injectable 40 milliGRAM(s) SubCutaneous daily  folic acid 1 milliGRAM(s) Oral daily  methylPREDNISolone sodium succinate IVPB 1000 milliGRAM(s) IV Intermittent daily  pantoprazole    Tablet 40 milliGRAM(s) Oral daily    PRN MEDICATIONS  meclizine 25 milliGRAM(s) Oral every 6 hours PRN  melatonin 5 milliGRAM(s) Oral <User Schedule> PRN      VITAL SIGNS: Last 24 Hours  T(C): 35.9 (24 Oct 2019 05:06), Max: 35.9 (23 Oct 2019 13:13)  T(F): 96.6 (24 Oct 2019 05:06), Max: 96.7 (23 Oct 2019 13:13)  HR: 69 (24 Oct 2019 05:06) (69 - 99)  BP: 131/73 (24 Oct 2019 05:06) (103/63 - 139/62)  BP(mean): --  RR: 19 (24 Oct 2019 05:06) (16 - 19)  SpO2: --    LABS:      RADIOLOGY:  < from: MR Head w/wo IV Cont (10.21.19 @ 10:27) >  IMPRESSION:     1.  Redemonstrated multiple foci of white matter signal abnormality most   compatible with demyelinating disease. Since the prior exam of 12/24/2017:    2.  New enhancing lesion within the left middle cerebellar   peduncle/brainstem consistent with focus of active demyelination.    3.  Multiple new nonenhancing supratentorial lesions as described.    4.  Increased prominence of a lesion within the body of the corpus   callosum.    < end of copied text >      PHYSICAL EXAM:  CONSTITUTIONAL: No acute distress, well-developed, well-groomed, AAOx3  HEAD: Atraumatic, normocephalic  EYES: EOM intact, PERRLA, conjunctiva and sclera clear  ENT: Supple, no masses, no thyromegaly, no bruits, no JVD; moist mucous membranes  PULMONARY: Clear to auscultation bilaterally; no wheezes, rales, or rhonchi  CARDIOVASCULAR: Regular rate and rhythm; no murmurs, rubs, or gallops  GASTROINTESTINAL: Soft, non-tender, non-distended; bowel sounds present  MUSCULOSKELETAL: 2+ peripheral pulses; no clubbing, no cyanosis, no edema  NEUROLOGY: non-focal  SKIN: No rashes or lesions; warm and dry    ASSESSMENT & PLAN  Patient is a 23yo male with PMHx of vertigo, dizziness, and trauma to left pupil in 2009 secondary to paintball incident presenting with a 1-month history of dizziness. Patient's MRI brain on 12/24/2017 re-demonstrated white matter compatible with demyelinating disease, small enhancing lesion within left frontal coronal radiata, and also lesion within left aspect genu and left parietal lobe. He recently saw his neurologist Dr. [place] in [place] who prescribed an MRI brain and C-spine for MS work up as per patient.    #Relapsing remitting multiple sclerosis flair  - Patient was being evaluated for possible multiple sclerosis outpatient  - Initial evaluation for MS began approximately 3 years ago  - Neurology consult appreciated  - MR Brain and C-spine: multiple foci of demyelinating disease consistent with multiple sclerosis  - Continue with aspirin 81mg PO QD  - Continue with meclizine 25mg PO Q8H PRN dizziness  - Continue with methylprednisolone 1g IV Q24H for 5 days (Day #4)  - Continue with pantoprazole 40mg PO QD  - Continue with PT/rehab  - Follow up outpatient with his neurologist Dr. [place] in [place]  - Follow up outpatient with MS specialist Dr. Carola Traylor ([phone]) at [facility]    #Folate deficiency  - Continue with folate 1mg PO QD    #Misc  - DVT Prophylaxis: Lovenox 40mg SQ QD  - Diet: Regular  - GI Prophylaxis: pantoprazole 40mg PO QD  - Activity: Out of bed to chair  - IV Fluids: Encourage PO intake  - Code Status: Full Code    Dispo: Anticipate for discharge tomorrow

## 2019-10-25 VITALS — HEIGHT: 68 IN | WEIGHT: 180.78 LBS

## 2019-10-25 PROCEDURE — 99239 HOSP IP/OBS DSCHRG MGMT >30: CPT

## 2019-10-25 RX ORDER — MECLIZINE HCL 12.5 MG
1 TABLET ORAL
Qty: 0 | Refills: 0 | DISCHARGE
Start: 2019-10-25

## 2019-10-25 RX ADMIN — Medication 1 DROP(S): at 12:45

## 2019-10-25 RX ADMIN — ENOXAPARIN SODIUM 40 MILLIGRAM(S): 100 INJECTION SUBCUTANEOUS at 12:43

## 2019-10-25 RX ADMIN — Medication 81 MILLIGRAM(S): at 12:44

## 2019-10-25 RX ADMIN — PANTOPRAZOLE SODIUM 40 MILLIGRAM(S): 20 TABLET, DELAYED RELEASE ORAL at 12:43

## 2019-10-25 RX ADMIN — Medication 1000 UNIT(S): at 12:44

## 2019-10-25 RX ADMIN — Medication 1 MILLIGRAM(S): at 12:44

## 2019-10-25 RX ADMIN — Medication 50 MILLIGRAM(S): at 13:00

## 2019-10-25 NOTE — PROGRESS NOTE ADULT - PROVIDER SPECIALTY LIST ADULT
Hospitalist
Internal Medicine
Neurology
Neurology
Internal Medicine
Hospitalist

## 2019-10-25 NOTE — PROGRESS NOTE ADULT - SUBJECTIVE AND OBJECTIVE BOX
Pt seen and examined independently. No new complaints. Feeling better. Last dose of steroids today.    Gen: NAD, AAOx3  CV: S1 S2  Resp: Decreased BS b/l  GI: NT/ND/S +BS  MS: neg c/c/e  Neuro: nonfocal    Discharge instructions discussed and patient knows when to seek immediate medical attention.  Patient has proper follow up.  All results discussed and patient aware they may require further work up.  Stressed importance of proper follow up.  Medications prescribed and changes discussed.  All questions and concerns from patient and family addressed. Understanding of instructions verbalized.    Time spent in completing discharge process and coordinating care 35 minutes.    Discussed with housestaff, nursing, social work

## 2019-10-25 NOTE — DISCHARGE NOTE NURSING/CASE MANAGEMENT/SOCIAL WORK - PATIENT PORTAL LINK FT
You can access the FollowMyHealth Patient Portal offered by [facility] by registering at the following website: [email or web address]. By joining [facility]’s FollowMyHealth portal, you will also be able to view your health information using other applications (apps) compatible with our system.

## 2019-10-28 ENCOUNTER — INBOUND DOCUMENT (OUTPATIENT)
Age: 24
End: 2019-10-28

## 2019-10-28 PROBLEM — Z00.00 ENCOUNTER FOR PREVENTIVE HEALTH EXAMINATION: Status: ACTIVE | Noted: 2019-10-28

## 2019-10-29 DIAGNOSIS — R42 DIZZINESS AND GIDDINESS: ICD-10-CM

## 2019-10-29 DIAGNOSIS — G35 MULTIPLE SCLEROSIS: ICD-10-CM

## 2019-10-29 DIAGNOSIS — R20.0 ANESTHESIA OF SKIN: ICD-10-CM

## 2019-10-29 DIAGNOSIS — E53.8 DEFICIENCY OF OTHER SPECIFIED B GROUP VITAMINS: ICD-10-CM
